# Patient Record
Sex: FEMALE | Race: WHITE | NOT HISPANIC OR LATINO | Employment: FULL TIME | URBAN - METROPOLITAN AREA
[De-identification: names, ages, dates, MRNs, and addresses within clinical notes are randomized per-mention and may not be internally consistent; named-entity substitution may affect disease eponyms.]

---

## 2020-01-06 ENCOUNTER — TELEPHONE (OUTPATIENT)
Dept: PULMONOLOGY | Facility: CLINIC | Age: 60
End: 2020-01-06

## 2020-01-16 NOTE — PROGRESS NOTES
Pulmonary Consultation   Latha Pierce 63 y o  female MRN: [id number]  1/17/2020      Assessment:    COPD (chronic obstructive pulmonary disease) ([place] [address] )  Patient presents with a history of at least moderate COPD  She has been out of her medications for several months  There's no evidence of acute exacerbation today  However she does report a persistent and daily cough that is affecting her ADLs  Plan  Have refilled patient's Advair, albuterol inhaler and albuterol nebs  Offered influenza vaccination but she states she cannot afford it today  Will need to review her history to see if she has had lung cancer screening  Due to time constraints today will have the patient follow-up with the next few weeks for repeat assessment  I counseled patient without tobacco use and advised her to complete entirely  Tobacco use  Advised to stop smoking  She is down to 5 cigarettes per day  Cough  Likely secondary to underlying COPD and continued tobacco use  Plan   Cont copd treatment   Tobacco cessation    Intercostal pain  Predominantly right-sided  Has tenderness to palpation of thorax  Pain is secondary to musculoskeletal irritation due to persistent/chronic coughing  Plan  Patient advised to alternate between Motrin and Tylenol  Given prescription for lidocaine patches as well  Plan:    Diagnoses and all orders for this visit:    Chronic obstructive pulmonary disease, unspecified COPD type ([place] [address] )  -     albuterol (PROVENTIL HFA,VENTOLIN HFA) 90 mcg/act inhaler; Inhale 2 puffs every 6 (six) hours as needed for wheezing  -     albuterol (2 5 mg/3 mL) 0 083 % nebulizer solution; Take 1 vial (2 5 mg total) by nebulization every 6 (six) hours as needed for wheezing or shortness of breath  -     fluticasone-salmeterol (ADVAIR, WIXELA) 250-50 mcg/dose inhaler; Inhale 1 puff 2 (two) times a day Rinse mouth after use  -     lidocaine (LIDODERM) 5 %;  Apply 1 patch topically daily Remove & Discard patch within 12 hours or as directed by MD    Other orders  -     Discontinue: fluticasone-salmeterol (Almas Lundberg) 250-50 mcg/dose inhaler; Inhale 1 puff 2 (two) times a day Rinse mouth after use  -     Discontinue: albuterol (PROVENTIL HFA,VENTOLIN HFA) 90 mcg/act inhaler; Inhale 2 puffs every 6 (six) hours as needed for wheezing  -     Discontinue: albuterol (2 5 mg/3 mL) 0 083 % nebulizer solution; Take 2 5 mg by nebulization every 6 (six) hours as needed for wheezing or shortness of breath        Return in about 3 weeks (around 2/7/2020)  History of Present Illness   HPI:  Roseline Valenzuela is a 63 y o  female with history of COPD  Patient presents as a new patient but is unfortunately is late to the appointment so the assessment is limited  Will plan on bringing her back within the next few weeks for full evaluation  Patient was previously seen by a pulmonologist in [place]  She was last seen by them in March 2019  Evidently she has a history of moderate COPD  She continues to smoke but is down to 5 cigarettes per day from a peak of 1 pack per day  She was given a trial of trelegy by her last pulmonologist and stated that it worked very well for her but unfortunately she was not able to afford it  She was also prescribed Advair at 1 point but has been off of this medication for a nearly 1 year  She is currently not taking any medications other than albuterol  She is requesting refills on her albuterol and albuterol nebulizer solution as well  She is mostly bothered by a chronic cough, typically nonproductive  She has right-sided muscle skeletal/thoracic pain related to the cough  She states that the cough is very limiting and has caused her to have syncope in the past   It affects her work  During the day she does not smoke but when she gets off she uses tobacco   She has not had a recent fevers, chills or sweats    She states that she did go to an urgent care within the past year for acute exacerbation of COPD  No recent hospitalizations  No other acute complaints today  Review of Systems   Constitutional: Negative for chills, fatigue and fever  HENT: Negative for congestion, nosebleeds, postnasal drip, rhinorrhea, sinus pressure and sore throat  Eyes: Negative for discharge, redness and itching  Respiratory: Positive for cough, shortness of breath and wheezing  Negative for choking, chest tightness and stridor  Cardiovascular: Negative for chest pain, palpitations and leg swelling  Gastrointestinal: Negative for blood in stool  Genitourinary: Negative for difficulty urinating and dysuria  Musculoskeletal: Negative for arthralgias, joint swelling and myalgias  Chronic thoracic pain  Exacerbated by coughing  Predominantly on the right side  Skin: Negative for color change and rash  Neurological: Negative for light-headedness and headaches  Hematological: Negative for adenopathy  Historical Information   No past medical history on file  No past surgical history on file  No family history on file    Hobbies:   Occupation:       Meds/Allergies     Current Outpatient Medications:     albuterol (2 5 mg/3 mL) 0 083 % nebulizer solution, Take 1 vial (2 5 mg total) by nebulization every 6 (six) hours as needed for wheezing or shortness of breath, Disp: 30 vial, Rfl: 3    albuterol (PROVENTIL HFA,VENTOLIN HFA) 90 mcg/act inhaler, Inhale 2 puffs every 6 (six) hours as needed for wheezing, Disp: 1 Inhaler, Rfl: 3    fluticasone-salmeterol (ADVAIR, WIXELA) 250-50 mcg/dose inhaler, Inhale 1 puff 2 (two) times a day Rinse mouth after use , Disp: 1 Inhaler, Rfl: 3    lidocaine (LIDODERM) 5 %, Apply 1 patch topically daily Remove & Discard patch within 12 hours or as directed by MD, Disp: 30 patch, Rfl: 3  Allergies no known allergies    Vitals: Blood pressure 120/80, pulse 92, temperature (!) 96 8 °F (36 °C), temperature source Tympanic, height 5' 4" (1 626 m), weight 83 5 kg (184 lb), SpO2 98 %  Body mass index is 31 58 kg/m²  Oxygen Therapy  SpO2: 98 %  Oxygen Therapy: None (Room air)      Physical Exam  Physical Exam   Constitutional: She is oriented to person, place, and time  She appears well-developed and well-nourished  No distress  HENT:   Head: Normocephalic and atraumatic  Right Ear: External ear normal    Left Ear: External ear normal    Nose: Nose normal    Mouth/Throat: Oropharynx is clear and moist  No oropharyngeal exudate  Eyes: Pupils are equal, round, and reactive to light  Conjunctivae and EOM are normal    Neck: Normal range of motion  No tracheal deviation present  Cardiovascular: Normal rate, regular rhythm, normal heart sounds and intact distal pulses  Exam reveals no gallop and no friction rub  No murmur heard  Pulmonary/Chest: Effort normal and breath sounds normal  No stridor  She has no wheezes  She has no rales  Musculoskeletal: She exhibits no edema  Lymphadenopathy:        Head (right side): No submental, no submandibular, no preauricular and no posterior auricular adenopathy present  Head (left side): No submental, no submandibular, no preauricular and no posterior auricular adenopathy present  She has no cervical adenopathy  Neurological: She is alert and oriented to person, place, and time  Skin: Skin is warm and dry  Psychiatric: She has a normal mood and affect  Vitals reviewed  Labs: I have personally reviewed pertinent lab results  No results found for: WBC, HGB, HCT, MCV, PLT  No results found for: GLUCOSE, CALCIUM, NA, K, CO2, CL, BUN, CREATININE  No results found for: IGE  No results found for: ALT, AST, GGT, ALKPHOS, BILITOT    Imaging and other studies:   None   Pulmonary function testing:   None     EKG, Pathology, and Other Studies:       [facility]

## 2020-01-17 ENCOUNTER — OFFICE VISIT (OUTPATIENT)
Dept: PULMONOLOGY | Facility: CLINIC | Age: 60
End: 2020-01-17

## 2020-01-17 VITALS
HEIGHT: 64 IN | HEART RATE: 92 BPM | DIASTOLIC BLOOD PRESSURE: 80 MMHG | BODY MASS INDEX: 31.41 KG/M2 | TEMPERATURE: 96.8 F | SYSTOLIC BLOOD PRESSURE: 120 MMHG | OXYGEN SATURATION: 98 % | WEIGHT: 184 LBS

## 2020-01-17 DIAGNOSIS — J44.9 CHRONIC OBSTRUCTIVE PULMONARY DISEASE, UNSPECIFIED COPD TYPE (HCC): Primary | ICD-10-CM

## 2020-01-17 PROBLEM — R05.9 COUGH: Status: ACTIVE | Noted: 2020-01-17

## 2020-01-17 PROBLEM — Z72.0 TOBACCO USE: Status: ACTIVE | Noted: 2020-01-17

## 2020-01-17 PROBLEM — R07.82 INTERCOSTAL PAIN: Status: ACTIVE | Noted: 2020-01-17

## 2020-01-17 PROCEDURE — 99204 OFFICE O/P NEW MOD 45 MIN: CPT | Performed by: INTERNAL MEDICINE

## 2020-01-17 RX ORDER — ALBUTEROL SULFATE 90 UG/1
2 AEROSOL, METERED RESPIRATORY (INHALATION) EVERY 6 HOURS PRN
COMMUNITY
End: 2020-01-17 | Stop reason: SDUPTHER

## 2020-01-17 RX ORDER — ALBUTEROL SULFATE 90 UG/1
2 AEROSOL, METERED RESPIRATORY (INHALATION) EVERY 6 HOURS PRN
Qty: 1 INHALER | Refills: 3 | Status: SHIPPED | OUTPATIENT
Start: 2020-01-17 | End: 2020-02-20 | Stop reason: SDUPTHER

## 2020-01-17 RX ORDER — ALBUTEROL SULFATE 2.5 MG/3ML
2.5 SOLUTION RESPIRATORY (INHALATION) EVERY 6 HOURS PRN
COMMUNITY
End: 2020-01-17 | Stop reason: SDUPTHER

## 2020-01-17 RX ORDER — LIDOCAINE 50 MG/G
1 PATCH TOPICAL DAILY
Qty: 30 PATCH | Refills: 3 | Status: SHIPPED | OUTPATIENT
Start: 2020-01-17 | End: 2021-06-01

## 2020-01-17 RX ORDER — ALBUTEROL SULFATE 2.5 MG/3ML
2.5 SOLUTION RESPIRATORY (INHALATION) EVERY 6 HOURS PRN
Qty: 30 VIAL | Refills: 3 | Status: SHIPPED | OUTPATIENT
Start: 2020-01-17 | End: 2020-02-20 | Stop reason: SDUPTHER

## 2020-01-17 NOTE — ASSESSMENT & PLAN NOTE
Likely secondary to underlying COPD and continued tobacco use       Plan   Cont copd treatment   Tobacco cessation

## 2020-01-17 NOTE — ASSESSMENT & PLAN NOTE
Predominantly right-sided  Has tenderness to palpation of thorax  Pain is secondary to musculoskeletal irritation due to persistent/chronic coughing  Plan  Patient advised to alternate between Motrin and Tylenol  Given prescription for lidocaine patches as well

## 2020-01-17 NOTE — ASSESSMENT & PLAN NOTE
Patient presents with a history of at least moderate COPD  She has been out of her medications for several months  There's no evidence of acute exacerbation today  However she does report a persistent and daily cough that is affecting her ADLs  Plan  Have refilled patient's Advair, albuterol inhaler and albuterol nebs  Offered influenza vaccination but she states she cannot afford it today  Will need to review her history to see if she has had lung cancer screening  Due to time constraints today will have the patient follow-up with the next few weeks for repeat assessment  I counseled patient without tobacco use and advised her to complete entirely

## 2020-02-19 NOTE — PROGRESS NOTES
Pulmonary Follow Up Note   Flaco Liu 63 y o  female MRN: [id number]  2/20/2020      Assessment:    COPD (chronic obstructive pulmonary disease) ([facility] )  Patient is a 72-year-old woman with a history moderate COPD presenting to the Pulmonary office for routine follow-up  During her last visit inhaler therapy was prescribed (had been off inhalers for over a year due to insurance issues)  She is currently taking Advair and albuterol nebs/inhaler  She has noticed a improvement in her shortness of breath  She has not had any recent exacerbations requiring ER visits or hospitalization  She still does have a persistent cough that is likely multifactorial     On examination no evidence of exacerbation today  Offered her a referrall for pulmonary function testing but she doesn't want to completed this time  Plan on continuing current inhalers and adding long-acting muscarinic antagonist inhaler  Patient has financial constraints and has had difficulty affording medications  She was given patient assistance packet today for Spiriva  Also given samples for Spiriva Respimat  She tells me that she had a CT of the chest within the past year that showed no evidence of lung cancer  Plan  Continue albuterol and Advair  Start Spiriva Respimat  Will offer pulmonary function tests again during next encounter  Follow-up Pulmonary office in 3 months  Will need repeat CT of the chest in 1 year for lung cancer screening  Review immunizations    Tobacco use  Continues to smoke  Has cut down to 5 cigarettes per day, but has not been able to reduce it any further  Not interested in going back to cessation classes  She states that her job and life are stressful and cigarettes help her relax  Plan   Continue to encourage cessation  I informed her that her continued tobacco use is causing lung damage and also aggravating her cough  GERD (gastroesophageal reflux disease)  Reports daily heart burn   I suspect this is also aggravating her cough  She states that takes     Plan   Patient states she will buy  over-the-counter PPI  Continue related    Intercostal pain  Secondary to coughing  Improved with lidocaine patch     Plan   Cont lidocaine patch   Cont treatment of cough     Cough  Likely multifactorial secondary to underlying COPD, GERD, upper airway cough syndrome and continued tobacco use  Plan  Treat underlying conditions  Pulmonary nodules  Review of outside notes from her prior pulmonary provider shows that she has undergone CT scanning of the chest that showed bilateral pulmonary nodules that were felt to be inflammatory/post infectious  Follow-up imaging showed stability  However those images are not available for my review  I asked the patient about that workup and she states she had a CT scan 1 year ago that was normal   I think that she will benefit from yearly lung cancer screening  Plan:    Diagnoses and all orders for this visit:    Cough    Intercostal pain    Tobacco use    Chronic obstructive pulmonary disease, unspecified COPD type ([facility] )  -     albuterol (2 5 mg/3 mL) 0 083 % nebulizer solution; Take 1 vial (2 5 mg total) by nebulization every 6 (six) hours as needed for wheezing or shortness of breath  -     albuterol (PROVENTIL HFA,VENTOLIN HFA) 90 mcg/act inhaler; Inhale 2 puffs every 6 (six) hours as needed for wheezing    Post-nasal drip  -     mometasone (NASONEX) 50 mcg/act nasal spray; 2 sprays into each nostril daily        Return in about 3 months (around 5/20/2020) for Recheck  History of Present Illness   HPI:  Lawerence Schirmer is a 63 y o  female who is here to follow-up on COPD  She was seen in November to the as 20  Previously was followed by a pulmonologist in [place]  Patient is an active smoker  Previously treated with trelegy inhaler that was effective  However unable to afford the medication  When I last saw her she has been off of her Advair for over 1 year    She is not taking any medications  The time she reported significant and chronic cough, right-sided thoracic pain related to the cough  She admitted urgent care within the past year related to COPD exacerbation  During that encounter I feel the patient after an albuterol inhaler/albuterol nebs  She was offered influenza vaccination at that time does not include afford  She was advised to stop smoking  She is given lidocaine patches for the intercostal pain  She presents today for follow-up  She states she is doing okay  Her breathing is much better  She using lidocaine patches for her R chest pain with good improvement  She is using nebulize every morning  However, she still has a persistent cough  She is taking mucinex  She is constantly having nasal congestion and drainage that leads to her cough  She denies allergy symptoms  She does have chronic heart burn  She is still smoking 5 cigarettes per day (peak was smoking 1-1 5 packs per day)  She states she has tried multiple medications and classes w/o success  Review of Systems   Constitutional: Negative for chills, fatigue and fever  HENT: Positive for postnasal drip  Negative for congestion, nosebleeds, rhinorrhea, sinus pressure and sore throat  Eyes: Negative for discharge, redness and itching  Respiratory: Positive for cough, shortness of breath and wheezing  Negative for choking, chest tightness and stridor  Cardiovascular: Negative for chest pain, palpitations and leg swelling  Gastrointestinal: Negative for blood in stool  Genitourinary: Negative for difficulty urinating and dysuria  Musculoskeletal: Negative for arthralgias, joint swelling and myalgias  Skin: Negative for color change and rash  Neurological: Negative for light-headedness and headaches  Hematological: Negative for adenopathy         Historical Information   Past Medical History:   Diagnosis Date    COPD (chronic obstructive pulmonary disease) [facility])      History reviewed  No pertinent surgical history  Family History   Problem Relation Age of Onset    Alzheimer's disease Father          Meds/Allergies     Current Outpatient Medications:     albuterol (2 5 mg/3 mL) 0 083 % nebulizer solution, Take 1 vial (2 5 mg total) by nebulization every 6 (six) hours as needed for wheezing or shortness of breath, Disp: 30 vial, Rfl: 3    albuterol (PROVENTIL HFA,VENTOLIN HFA) 90 mcg/act inhaler, Inhale 2 puffs every 6 (six) hours as needed for wheezing, Disp: 1 Inhaler, Rfl: 3    fluticasone-salmeterol (ADVAIR, WIXELA) 250-50 mcg/dose inhaler, Inhale 1 puff 2 (two) times a day Rinse mouth after use , Disp: 1 Inhaler, Rfl: 3    lidocaine (LIDODERM) 5 %, Apply 1 patch topically daily Remove & Discard patch within 12 hours or as directed by MD, Disp: 30 patch, Rfl: 3    mometasone (NASONEX) 50 mcg/act nasal spray, 2 sprays into each nostril daily, Disp: 1 Act, Rfl: 3  No Known Allergies    Vitals: Blood pressure 132/80, pulse 83, temperature (!) 97 °F (36 1 °C), temperature source Tympanic, height 5' 4" (1 626 m), weight 81 6 kg (180 lb), SpO2 98 %  Body mass index is 30 9 kg/m²  Oxygen Therapy  SpO2: 98 %  Oxygen Therapy: None (Room air)      Physical Exam  Physical Exam   Constitutional: She is oriented to person, place, and time  She appears well-developed and well-nourished  No distress  Tired appearing  Coughing intermittently throughout the encounter  HENT:   Head: Normocephalic and atraumatic  Right Ear: External ear normal    Left Ear: External ear normal    Nose: Nose normal    Mouth/Throat: Oropharynx is clear and moist  No oropharyngeal exudate  Eyes: Pupils are equal, round, and reactive to light  Conjunctivae and EOM are normal    Neck: Normal range of motion  No tracheal deviation present  Cardiovascular: Normal rate, regular rhythm, normal heart sounds and intact distal pulses  Exam reveals no gallop and no friction rub     No murmur heard   Pulmonary/Chest: Effort normal and breath sounds normal  No stridor  She has no wheezes  She has no rales  Musculoskeletal: She exhibits no edema  Lymphadenopathy:        Head (right side): No submental, no submandibular, no preauricular and no posterior auricular adenopathy present  Head (left side): No submental, no submandibular, no preauricular and no posterior auricular adenopathy present  She has no cervical adenopathy  Neurological: She is alert and oriented to person, place, and time  Skin: Skin is warm and dry  Psychiatric: She has a normal mood and affect  Vitals reviewed  Labs: I have personally reviewed pertinent lab results  Imaging and other studies: I have personally reviewed pertinent reports  Pulmonary function testing:     EKG, Pathology, and Other Studies:     [facility]

## 2020-02-20 ENCOUNTER — OFFICE VISIT (OUTPATIENT)
Dept: PULMONOLOGY | Facility: CLINIC | Age: 60
End: 2020-02-20

## 2020-02-20 VITALS
TEMPERATURE: 97 F | OXYGEN SATURATION: 98 % | HEART RATE: 83 BPM | HEIGHT: 64 IN | DIASTOLIC BLOOD PRESSURE: 80 MMHG | SYSTOLIC BLOOD PRESSURE: 132 MMHG | BODY MASS INDEX: 30.73 KG/M2 | WEIGHT: 180 LBS

## 2020-02-20 DIAGNOSIS — J44.9 CHRONIC OBSTRUCTIVE PULMONARY DISEASE, UNSPECIFIED COPD TYPE (HCC): ICD-10-CM

## 2020-02-20 DIAGNOSIS — R05.9 COUGH: Primary | ICD-10-CM

## 2020-02-20 DIAGNOSIS — R09.82 POST-NASAL DRIP: ICD-10-CM

## 2020-02-20 DIAGNOSIS — R07.82 INTERCOSTAL PAIN: ICD-10-CM

## 2020-02-20 DIAGNOSIS — Z72.0 TOBACCO USE: ICD-10-CM

## 2020-02-20 PROBLEM — K21.9 GERD (GASTROESOPHAGEAL REFLUX DISEASE): Status: ACTIVE | Noted: 2020-02-20

## 2020-02-20 PROBLEM — R91.8 PULMONARY NODULES: Status: ACTIVE | Noted: 2020-02-20

## 2020-02-20 PROCEDURE — 99215 OFFICE O/P EST HI 40 MIN: CPT | Performed by: INTERNAL MEDICINE

## 2020-02-20 RX ORDER — ALBUTEROL SULFATE 90 UG/1
2 AEROSOL, METERED RESPIRATORY (INHALATION) EVERY 6 HOURS PRN
Qty: 1 INHALER | Refills: 3 | Status: SHIPPED | OUTPATIENT
Start: 2020-02-20 | End: 2020-12-04 | Stop reason: SDUPTHER

## 2020-02-20 RX ORDER — MOMETASONE FUROATE 50 UG/1
2 SPRAY, METERED NASAL DAILY
Qty: 1 ACT | Refills: 3 | Status: SHIPPED | OUTPATIENT
Start: 2020-02-20 | End: 2022-04-18

## 2020-02-20 RX ORDER — ALBUTEROL SULFATE 2.5 MG/3ML
2.5 SOLUTION RESPIRATORY (INHALATION) EVERY 6 HOURS PRN
Qty: 30 VIAL | Refills: 3 | Status: SHIPPED | OUTPATIENT
Start: 2020-02-20 | End: 2020-11-09 | Stop reason: SDUPTHER

## 2020-02-20 NOTE — ASSESSMENT & PLAN NOTE
Secondary to coughing   Improved with lidocaine patch     Plan   Cont lidocaine patch   Cont treatment of cough

## 2020-02-20 NOTE — ASSESSMENT & PLAN NOTE
Continues to smoke  Has cut down to 5 cigarettes per day, but has not been able to reduce it any further  Not interested in going back to cessation classes  She states that her job and life are stressful and cigarettes help her relax  Plan   Continue to encourage cessation  I informed her that her continued tobacco use is causing lung damage and also aggravating her cough

## 2020-02-20 NOTE — PATIENT INSTRUCTIONS
For [facility] rinse kit use sterile water or distilled water  Can be used 1-2 times per day  Recommend using it prior to using intranasal medications (flonase)       Use acid reflux medication daily     Try to cut down on tobacco completely

## 2020-02-20 NOTE — ASSESSMENT & PLAN NOTE
Patient is a 81-year-old woman with a history moderate COPD presenting to the Pulmonary office for routine follow-up  During her last visit inhaler therapy was prescribed (had been off inhalers for over a year due to insurance issues)  She is currently taking Advair and albuterol nebs/inhaler  She has noticed a improvement in her shortness of breath  She has not had any recent exacerbations requiring ER visits or hospitalization  She still does have a persistent cough that is likely multifactorial     On examination no evidence of exacerbation today  Offered her a referrall for pulmonary function testing but she doesn't want to completed this time  Plan on continuing current inhalers and adding long-acting muscarinic antagonist inhaler  Patient has financial constraints and has had difficulty affording medications  She was given patient assistance packet today for Spiriva  Also given samples for Spiriva Respimat  She tells me that she had a CT of the chest within the past year that showed no evidence of lung cancer      Plan  Continue albuterol and Advair  Start Spiriva Respimat  Will offer pulmonary function tests again during next encounter  Follow-up Pulmonary office in 3 months  Will need repeat CT of the chest in 1 year for lung cancer screening  Review immunizations

## 2020-02-20 NOTE — ASSESSMENT & PLAN NOTE
Likely multifactorial secondary to underlying COPD, GERD, upper airway cough syndrome and continued tobacco use  Plan  Treat underlying conditions

## 2020-02-20 NOTE — ASSESSMENT & PLAN NOTE
Review of outside notes from her prior pulmonary provider shows that she has undergone CT scanning of the chest that showed bilateral pulmonary nodules that were felt to be inflammatory/post infectious  Follow-up imaging showed stability  However those images are not available for my review  I asked the patient about that workup and she states she had a CT scan 1 year ago that was normal   I think that she will benefit from yearly lung cancer screening

## 2020-04-22 ENCOUNTER — TELEPHONE (OUTPATIENT)
Dept: PULMONOLOGY | Facility: CLINIC | Age: 60
End: 2020-04-22

## 2020-11-09 DIAGNOSIS — J44.9 CHRONIC OBSTRUCTIVE PULMONARY DISEASE, UNSPECIFIED COPD TYPE (HCC): ICD-10-CM

## 2020-11-09 RX ORDER — ALBUTEROL SULFATE 2.5 MG/3ML
2.5 SOLUTION RESPIRATORY (INHALATION) EVERY 6 HOURS PRN
Qty: 30 VIAL | Refills: 4 | Status: SHIPPED | OUTPATIENT
Start: 2020-11-09 | End: 2020-12-04 | Stop reason: SDUPTHER

## 2020-12-04 ENCOUNTER — OFFICE VISIT (OUTPATIENT)
Dept: PULMONOLOGY | Facility: CLINIC | Age: 60
End: 2020-12-04

## 2020-12-04 VITALS
OXYGEN SATURATION: 97 % | HEART RATE: 102 BPM | SYSTOLIC BLOOD PRESSURE: 120 MMHG | WEIGHT: 172 LBS | RESPIRATION RATE: 16 BRPM | BODY MASS INDEX: 29.52 KG/M2 | DIASTOLIC BLOOD PRESSURE: 80 MMHG | TEMPERATURE: 96.4 F

## 2020-12-04 DIAGNOSIS — Z72.0 TOBACCO USE: ICD-10-CM

## 2020-12-04 DIAGNOSIS — R91.8 PULMONARY NODULES: ICD-10-CM

## 2020-12-04 DIAGNOSIS — J44.9 CHRONIC OBSTRUCTIVE PULMONARY DISEASE, UNSPECIFIED COPD TYPE (HCC): ICD-10-CM

## 2020-12-04 DIAGNOSIS — R05.9 COUGH: Primary | ICD-10-CM

## 2020-12-04 PROCEDURE — 99213 OFFICE O/P EST LOW 20 MIN: CPT | Performed by: INTERNAL MEDICINE

## 2020-12-04 RX ORDER — ALBUTEROL SULFATE 2.5 MG/3ML
2.5 SOLUTION RESPIRATORY (INHALATION) EVERY 6 HOURS PRN
Qty: 30 VIAL | Refills: 4 | Status: SHIPPED | OUTPATIENT
Start: 2020-12-04 | End: 2021-10-15 | Stop reason: SDUPTHER

## 2020-12-04 RX ORDER — BENZONATATE 100 MG/1
100 CAPSULE ORAL 3 TIMES DAILY PRN
Qty: 20 CAPSULE | Refills: 0 | Status: SHIPPED | OUTPATIENT
Start: 2020-12-04 | End: 2021-05-14 | Stop reason: SDUPTHER

## 2020-12-04 RX ORDER — ALBUTEROL SULFATE 90 UG/1
2 AEROSOL, METERED RESPIRATORY (INHALATION) EVERY 6 HOURS PRN
Qty: 1 INHALER | Refills: 3 | Status: SHIPPED | OUTPATIENT
Start: 2020-12-04 | End: 2022-02-14 | Stop reason: SDUPTHER

## 2021-01-21 DIAGNOSIS — J44.9 CHRONIC OBSTRUCTIVE PULMONARY DISEASE, UNSPECIFIED COPD TYPE (HCC): Primary | ICD-10-CM

## 2021-03-10 DIAGNOSIS — Z23 ENCOUNTER FOR IMMUNIZATION: ICD-10-CM

## 2021-03-16 ENCOUNTER — IMMUNIZATIONS (OUTPATIENT)
Dept: FAMILY MEDICINE CLINIC | Facility: HOSPITAL | Age: 61
End: 2021-03-16

## 2021-03-16 DIAGNOSIS — Z23 ENCOUNTER FOR IMMUNIZATION: Primary | ICD-10-CM

## 2021-03-16 PROCEDURE — 91300 SARS-COV-2 / COVID-19 MRNA VACCINE (PFIZER-BIONTECH) 30 MCG: CPT

## 2021-03-16 PROCEDURE — 0001A SARS-COV-2 / COVID-19 MRNA VACCINE (PFIZER-BIONTECH) 30 MCG: CPT

## 2021-03-30 ENCOUNTER — TELEPHONE (OUTPATIENT)
Dept: PULMONOLOGY | Facility: CLINIC | Age: 61
End: 2021-03-30

## 2021-04-06 ENCOUNTER — IMMUNIZATIONS (OUTPATIENT)
Dept: FAMILY MEDICINE CLINIC | Facility: HOSPITAL | Age: 61
End: 2021-04-06

## 2021-04-06 DIAGNOSIS — Z23 ENCOUNTER FOR IMMUNIZATION: Primary | ICD-10-CM

## 2021-04-06 PROCEDURE — 0002A SARS-COV-2 / COVID-19 MRNA VACCINE (PFIZER-BIONTECH) 30 MCG: CPT

## 2021-04-06 PROCEDURE — 91300 SARS-COV-2 / COVID-19 MRNA VACCINE (PFIZER-BIONTECH) 30 MCG: CPT

## 2021-04-09 ENCOUNTER — TELEPHONE (OUTPATIENT)
Dept: PULMONOLOGY | Facility: CLINIC | Age: 61
End: 2021-04-09

## 2021-04-09 NOTE — TELEPHONE ENCOUNTER
David Naa came to the office because she did not get our message regarding her appointment  She said she feels fine and is very appreciative of the 6 months of trelegy for free  I did provider her with a spacer for her rescue inhaler, and added her to the recall list for 6months  She said that she will call if anything comes up but really just wanted to Thank you for everything you did

## 2021-05-14 DIAGNOSIS — R05.9 COUGH: ICD-10-CM

## 2021-05-14 RX ORDER — BENZONATATE 100 MG/1
100 CAPSULE ORAL 3 TIMES DAILY PRN
Qty: 90 CAPSULE | Refills: 0 | Status: SHIPPED | OUTPATIENT
Start: 2021-05-14 | End: 2021-09-17 | Stop reason: SDUPTHER

## 2021-05-31 NOTE — PROGRESS NOTES
Pulmonary Follow Up Note   Aziza Crum 62 y o  female MRN: [id number]  6/1/2021      Assessment:    COPD (chronic obstructive pulmonary disease) ([facility] [address] )  Patient reports overall improvement since starting Trelegy  She is happy that she has been able to receive medication assistance  She has not had any recent exacerbations of her COPD  However that she is still experiencing cough induced syncope  Plan   Continue Trelegy, albuterol  Given samples today for highest dose of trelegy to see if that helps to mitigate her cough  Still needs lung cancer screening CT   I have referred her to our smoking cessation program   She is very interested in quitting  Syncope    Patient has cough induced syncope  May benefit from referral to cardiology is well to discern if there are other causes  In the meantime continue COPD medications  Also empirically started treatment with gabapentin 100 mg t i d    Can titrate this dose if  Tolerated  Tobacco use    Referred to smoking cessation program    Pulmonary nodules  Review of outside notes from her prior pulmonary provider shows that she has undergone CT scanning of the chest that showed bilateral pulmonary nodules that were felt to be inflammatory/post infectious   Follow-up imaging showed stability   However those images are not available for my review       Will discuss during follow-up  Cough  Likely multifactorial secondary to underlying COPD, GERD, upper airway cough syndrome and continued tobacco use      Plan  Treat underlying conditions  Trial of gabapentin  Plan:    Diagnoses and all orders for this visit:    Cigarette nicotine dependence without complication  -     Ambulatory referral to Smoking Cessation Program; Future    Tobacco use    Pulmonary nodules    Cough    Chronic obstructive pulmonary disease, unspecified COPD type (HCC)  -     gabapentin (NEURONTIN) 100 mg capsule;  Take 1 capsule (100 mg total) by mouth 3 (three) times a day    Vasovagal syncope  -     gabapentin (NEURONTIN) 100 mg capsule; Take 1 capsule (100 mg total) by mouth 3 (three) times a day        No follow-ups on file  History of Present Illness   HPI:  Antonio George is a 62 y o  female history of moderate COPD  She presents today for routine follow-up  States that trelegy has helped  She has been able to get medication assistance  However, has cough induced syncope  Two weeks ago she passed out and hit her left side  She has a bruise on her left flank/back  She has gained 12 pounds  She is interested in quitting smoking  she does not have any other acute complaints today  Review of Systems   Constitutional: Negative for chills, fatigue and fever  HENT: Negative for congestion, nosebleeds, postnasal drip, rhinorrhea, sinus pressure and sore throat  Eyes: Negative for discharge, redness and itching  Respiratory: Positive for cough and shortness of breath  Negative for choking, chest tightness, wheezing and stridor  Cardiovascular: Negative for chest pain, palpitations and leg swelling  Gastrointestinal: Negative for blood in stool  Genitourinary: Negative for difficulty urinating and dysuria  Musculoskeletal: Negative for arthralgias, joint swelling and myalgias  Skin: Negative for color change and rash  Neurological: Negative for light-headedness and headaches  Hematological: Negative for adenopathy  Historical Information   Past Medical History:   Diagnosis Date    [facility]      History reviewed  No pertinent surgical history    Family History   Problem Relation Age of Onset    Alzheimer's disease Father          Meds/Allergies     Current Outpatient Medications:     albuterol (2 5 mg/3 mL) 0 083 % nebulizer solution, Take 1 vial (2 5 mg total) by nebulization every 6 (six) hours as needed for wheezing or shortness of breath, Disp: 30 vial, Rfl: 4    albuterol (PROVENTIL HFA,VENTOLIN HFA) 90 mcg/act inhaler, Inhale 2 puffs every 6 (six) hours as needed for wheezing, Disp: 1 Inhaler, Rfl: 3    benzonatate (TESSALON PERLES) 100 mg capsule, Take 1 capsule (100 mg total) by mouth 3 (three) times a day as needed for cough, Disp: 90 capsule, Rfl: 0    fluticasone-umeclidinium-vilanterol (TRELEGY) 100-62 5-25 MCG/INH inhaler, Inhale 1 puff daily Rinse mouth after use , Disp: 1 Inhaler, Rfl: 5    fluticasone-salmeterol (ADVAIR, WIXELA) 250-50 mcg/dose inhaler, Inhale 1 puff 2 (two) times a day Rinse mouth after use  (Patient not taking: Reported on 6/1/2021), Disp: 1 Inhaler, Rfl: 3    gabapentin (NEURONTIN) 100 mg capsule, Take 1 capsule (100 mg total) by mouth 3 (three) times a day, Disp: 90 capsule, Rfl: 3    mometasone (NASONEX) 50 mcg/act nasal spray, 2 sprays into each nostril daily (Patient not taking: Reported on 6/1/2021), Disp: 1 Act, Rfl: 3  No Known Allergies    Vitals: Blood pressure 148/90, pulse (!) 112, temperature 97 7 °F (36 5 °C), temperature source Temporal, height 5' 4" (1 626 m), weight 84 8 kg (187 lb), SpO2 98 %  Body mass index is 32 1 kg/m²  Oxygen Therapy  SpO2: 98 %  Oxygen Therapy: None (Room air)      Physical Exam  Physical Exam  Vitals signs reviewed  Constitutional:       General: She is not in acute distress  Appearance: She is well-developed  She is not ill-appearing or diaphoretic  HENT:      Head: Normocephalic and atraumatic  Right Ear: External ear normal       Left Ear: External ear normal       Nose: Nose normal  No congestion or rhinorrhea  Mouth/Throat:      Pharynx: No oropharyngeal exudate or posterior oropharyngeal erythema  Eyes:      General: No scleral icterus  Right eye: No discharge  Left eye: No discharge  Conjunctiva/sclera: Conjunctivae normal       Pupils: Pupils are equal, round, and reactive to light  Neck:      Musculoskeletal: Normal range of motion  Trachea: No tracheal deviation  Cardiovascular:      Rate and Rhythm: Normal rate and regular rhythm  Heart sounds: Normal heart sounds  No murmur  No friction rub  No gallop  Pulmonary:      Effort: Pulmonary effort is normal       Breath sounds: Normal breath sounds  No stridor  No wheezing or rales  Musculoskeletal:      Right lower leg: No edema  Left lower leg: No edema  Lymphadenopathy:      Head:      Right side of head: No submental, submandibular, preauricular or posterior auricular adenopathy  Left side of head: No submental, submandibular, preauricular or posterior auricular adenopathy  Cervical: No cervical adenopathy  Skin:     General: Skin is warm and dry  Findings: Bruising and lesion present  No erythema or rash  Neurological:      Mental Status: She is alert and oriented to person, place, and time  Labs:   None recently     Imaging and other studies:   Outside records reviewed from previous pulmonologist  Last seen in 2016  CT imaging at that time showed stable pulmonary nodules  Pulmonary function testing:   None recently   EKG, Pathology, and Other Studies:     JIM Castillo    [facility]

## 2021-06-01 ENCOUNTER — OFFICE VISIT (OUTPATIENT)
Dept: PULMONOLOGY | Facility: CLINIC | Age: 61
End: 2021-06-01

## 2021-06-01 VITALS
TEMPERATURE: 97.7 F | OXYGEN SATURATION: 98 % | HEART RATE: 112 BPM | WEIGHT: 187 LBS | SYSTOLIC BLOOD PRESSURE: 148 MMHG | HEIGHT: 64 IN | BODY MASS INDEX: 31.92 KG/M2 | DIASTOLIC BLOOD PRESSURE: 90 MMHG

## 2021-06-01 DIAGNOSIS — R91.8 PULMONARY NODULES: ICD-10-CM

## 2021-06-01 DIAGNOSIS — J44.9 CHRONIC OBSTRUCTIVE PULMONARY DISEASE, UNSPECIFIED COPD TYPE (HCC): ICD-10-CM

## 2021-06-01 DIAGNOSIS — R55 VASOVAGAL SYNCOPE: ICD-10-CM

## 2021-06-01 DIAGNOSIS — F17.210 CIGARETTE NICOTINE DEPENDENCE WITHOUT COMPLICATION: Primary | ICD-10-CM

## 2021-06-01 DIAGNOSIS — R05.9 COUGH: ICD-10-CM

## 2021-06-01 DIAGNOSIS — Z72.0 TOBACCO USE: ICD-10-CM

## 2021-06-01 PROCEDURE — 99215 OFFICE O/P EST HI 40 MIN: CPT | Performed by: INTERNAL MEDICINE

## 2021-06-01 RX ORDER — GABAPENTIN 100 MG/1
100 CAPSULE ORAL 3 TIMES DAILY
Qty: 90 CAPSULE | Refills: 3 | Status: SHIPPED | OUTPATIENT
Start: 2021-06-01 | End: 2021-08-03 | Stop reason: SDUPTHER

## 2021-06-01 NOTE — ASSESSMENT & PLAN NOTE
Review of outside notes from her prior pulmonary provider shows that she has undergone CT scanning of the chest that showed bilateral pulmonary nodules that were felt to be inflammatory/post infectious   Follow-up imaging showed stability   However those images are not available for my review       Will discuss during follow-up

## 2021-06-01 NOTE — ASSESSMENT & PLAN NOTE
Likely multifactorial secondary to underlying COPD, GERD, upper airway cough syndrome and continued tobacco use      Plan  Treat underlying conditions  Trial of gabapentin

## 2021-06-01 NOTE — ASSESSMENT & PLAN NOTE
Patient has cough induced syncope  May benefit from referral to cardiology is well to discern if there are other causes  In the meantime continue COPD medications  Also empirically started treatment with gabapentin 100 mg t i d    Can titrate this dose if  Tolerated

## 2021-06-01 NOTE — ASSESSMENT & PLAN NOTE
Patient reports overall improvement since starting Trelegy  She is happy that she has been able to receive medication assistance  She has not had any recent exacerbations of her COPD  However that she is still experiencing cough induced syncope  Plan   Continue Trelegy, albuterol  Given samples today for highest dose of trelegy to see if that helps to mitigate her cough  Still needs lung cancer screening CT   I have referred her to our smoking cessation program   She is very interested in quitting

## 2021-06-30 ENCOUNTER — TELEMEDICINE (OUTPATIENT)
Dept: PULMONOLOGY | Facility: CLINIC | Age: 61
End: 2021-06-30

## 2021-06-30 VITALS — BODY MASS INDEX: 31.92 KG/M2 | HEIGHT: 64 IN | WEIGHT: 187 LBS

## 2021-06-30 DIAGNOSIS — T65.294A TOXIC EFFECT OF TOBACCO, UNDETERMINED INTENT, INITIAL ENCOUNTER: Primary | ICD-10-CM

## 2021-06-30 PROBLEM — T65.291A TOXIC EFFECT OF TOBACCO: Status: ACTIVE | Noted: 2021-06-30

## 2021-06-30 PROCEDURE — 99214 OFFICE O/P EST MOD 30 MIN: CPT | Performed by: NURSE PRACTITIONER

## 2021-06-30 PROCEDURE — 99407 BEHAV CHNG SMOKING > 10 MIN: CPT | Performed by: NURSE PRACTITIONER

## 2021-06-30 RX ORDER — BUPROPION HYDROCHLORIDE 150 MG/1
TABLET, EXTENDED RELEASE ORAL
Qty: 60 TABLET | Refills: 2 | Status: SHIPPED | OUTPATIENT
Start: 2021-06-30 | End: 2022-04-18

## 2021-06-30 NOTE — ASSESSMENT & PLAN NOTE
·  Reason for training program:  Worsening Chronic obstructive pulmonary disease is currently affecting her everyday life  ·  smoking history:   Started at age 16  Was smoking an estimated 1 pack per day over the last year she has reduced down to an estimated 40 pack per year despite slowly reducing  ·   Barriers:  Smoking has become a significant part of her everyday life, currently she lives with 2 additional smokers although the barrier will likely turn into motivation as  all plan for cessation  ·  no full  Cessation in the past has been attempted  · Plan: start Wellbutrin 150mg daily x 3 days and then increase to 150mg BID  Initially discussed use of Chantix although she currently does not have prescription coverage and this medication will be cost prohibitive    ·   Started for  Wellbutrin 7/2, quit date for smoking 7/16  ·  if  Cravings become unbearable I have recommended that she add 7 mg nicotine patch or use nicotine gum or lozenges  ·  will follow-up in 4-6 weeks or sooner if needed

## 2021-06-30 NOTE — PROGRESS NOTES
Smoking Cessation Program  Shaylee Hazel is a 62 y o  y o  female  [id number]     Assessment/Plan:      Problem List Items Addressed This Visit        Other    Toxic effect of tobacco - Primary     ·  Reason for training program:  Worsening Chronic obstructive pulmonary disease is currently affecting her everyday life  ·  smoking history:   Started at age 16  Was smoking an estimated 1 pack per day over the last year she has reduced down to an estimated 40 pack per year despite slowly reducing  ·   Barriers:  Smoking has become a significant part of her everyday life, currently she lives with 2 additional smokers although the barrier will likely turn into motivation as  all plan for cessation  ·  no full  Cessation in the past has been attempted  · Plan: start Wellbutrin 150mg daily x 3 days and then increase to 150mg BID  Initially discussed use of Chantix although she currently does not have prescription coverage and this medication will be cost prohibitive  ·   Started for  Wellbutrin 7/2, quit date for smoking 7/16  ·  if  Cravings become unbearable I have recommended that she add 7 mg nicotine patch or use nicotine gum or lozenges  ·  will follow-up in 4-6 weeks or sooner if needed         Relevant Medications    buPROPion (Wellbutrin SR) 150 mg 12 hr tablet          I advised patient to quit, and offered support  Discussed current use pattern  Wellbutrin: denies h/o seizures or eating disorders  Follow up in 5 week or as needed     Barriers to quitting include: friends smoke, under a lot of stress now and engrained in daily life  Treatment Plan: wellbutrin         Dependence Severity:  severe            Return in 4 weeks (on 7/28/2021)  All questions are answered to the patient's satisfaction and understanding  She verbalizes understanding  She is encouraged to call with any further questions or concerns          ______________________________________________________________________ Patient ID: Sagrario Lim is a 62 y o  female  CC:    Chief Complaint   Patient presents with    Virtual Regular Visit    Nicotine Dependence       HPI:      HPI    Smoking history:  What type of tobacco product used:  Cigarettes    How many Packs of Cigarettes per day on average:  1/4 PPD    Largest amount of tobacco EVER used regularly: 1 PPD    How old when started using Tobacco regularly:  16years old    How many years using tobacco regularly:  47 years    How soon after waking up do you smoke:  6-30 Minutes    Past Attempts to go Tobacco free:   Intentionally Cut Down or limit use:  YES/NO: yes     How many serious attempt to go tobacco free:  None        Desire to Quit:  Quit within the next month    Motivations to Quit:  Family, Friends and Health Concerns    Scale on 1-10, how motivated:  7    Has a Healthcare Professional recommend smoking cessation? Yes    What Obstacles do you face for quitting:  Intense Cravings and Lack of willpower    From 1-10, how confident are you that you will bee Tobacco free in 6 Months? 7 1: Not confident, 10 Extremely Confident    * Arlen Singh has been smoking since age 16 she has recently reduced her cigarette steroid estimated quarter pack per day  She does continue to use those last few cigarettes as treats throughout the day  Discussed how removing these last  Few cigarettes would be beneficial  To fully   Break the habit  Overall her main motivation for smoking cessation is improve her health as Chronic obstructive pulmonary disease is not affecting her daily life  She does currently live with her sister and her sister's significant other, all 3 to smoke  All 3 individuals plan to work towards   Smoking cessation together  She does not have a history of eating disorder, seizures or bipolar disorder  She is interested in starting Wellbutrin due to cost of Chantix  Reviewed possible side effects of starting Wellbutrin      I have provided her with a coupon with good Rx use at her local [facility]  She will contact me for follow-up if necessary due to intense cravings and we may start nicotine replacement therapy  Review of Systems   Constitutional: Negative for activity change and diaphoresis  HENT: Negative for congestion and postnasal drip  Eyes: Negative for discharge and itching  Respiratory: Positive for cough and shortness of breath  Negative for apnea, choking, chest tightness, wheezing and stridor  Cardiovascular: Negative for chest pain and leg swelling  Gastrointestinal: Negative for abdominal distention  Endocrine: Negative for cold intolerance and heat intolerance  Genitourinary: Negative for difficulty urinating  Musculoskeletal: Negative for arthralgias  Skin: Negative for color change, pallor, rash and wound  Allergic/Immunologic: Negative for environmental allergies and food allergies  Neurological: Negative for dizziness  Hematological: Negative for adenopathy  Does not bruise/bleed easily  Psychiatric/Behavioral: Negative for agitation and behavioral problems  All other systems reviewed and are negative  The following portions of the patient's history were reviewed and updated as appropriate: allergies, current medications, past family history, past medical history, past social history, past surgical history and problem list      She reports that she has been smoking cigarettes  She started smoking about 47 years ago  She has a 47 00 pack-year smoking history  She has never used smokeless tobacco     Social history: She reports that she has been smoking cigarettes  She started smoking about 47 years ago  She has a 47 00 pack-year smoking history  She has never used smokeless tobacco  She reports previous alcohol use  She reports that she does not use drugs  Past Medical History:   Diagnosis Date    COPD (chronic obstructive pulmonary disease) ([facility] [address] )      History reviewed  No pertinent surgical history  Family History   Problem Relation Age of Onset    Alzheimer's disease Father      Immunization History   Administered Date(s) Administered    Influenza Quadrivalent Preservative Free 3 years and older IM 01/14/2017    SARS-CoV-2 / COVID-19 mRNA IM (Pf[employer]) 03/16/2021, 04/06/2021    Tdap 01/14/2017     (Not in a hospital admission)      Allergies: Patient has no known allergies  Objective:  Vitals:    06/30/21 0835   Weight: 84 8 kg (187 lb)   Height: 5' 4" (1 626 m)       Body mass index is 32 1 kg/m²  Physical Exam  Constitutional:       Appearance: Normal appearance  HENT:      Head: Normocephalic and atraumatic  Pulmonary:      Effort: Pulmonary effort is normal    Neurological:      General: No focal deficit present  Mental Status: She is alert and oriented to person, place, and time  Psychiatric:         Mood and Affect: Mood normal          Behavior: Behavior normal          Thought Content:  Thought content normal          Judgment: Judgment normal       no conversational dyspnea or audible wheezing noted    Lab Review:   not applicable  Results Reviewed     None           Diagnostics:  No orders to display           Electronically Signed by RAUL Ventura    I spent 26 minutes with patient today in which >10 minutes of the time was spent in counseling/coordination of care regarding tobacco cessation

## 2021-08-03 ENCOUNTER — OFFICE VISIT (OUTPATIENT)
Dept: PULMONOLOGY | Facility: CLINIC | Age: 61
End: 2021-08-03

## 2021-08-03 VITALS
OXYGEN SATURATION: 98 % | HEART RATE: 107 BPM | SYSTOLIC BLOOD PRESSURE: 116 MMHG | HEIGHT: 64 IN | BODY MASS INDEX: 31.58 KG/M2 | WEIGHT: 185 LBS | DIASTOLIC BLOOD PRESSURE: 68 MMHG | TEMPERATURE: 97.5 F

## 2021-08-03 DIAGNOSIS — J44.9 CHRONIC OBSTRUCTIVE PULMONARY DISEASE, UNSPECIFIED COPD TYPE (HCC): Primary | ICD-10-CM

## 2021-08-03 DIAGNOSIS — R05.9 COUGH: ICD-10-CM

## 2021-08-03 DIAGNOSIS — R55 VASOVAGAL SYNCOPE: ICD-10-CM

## 2021-08-03 DIAGNOSIS — Z72.0 TOBACCO USE: ICD-10-CM

## 2021-08-03 PROCEDURE — 99215 OFFICE O/P EST HI 40 MIN: CPT | Performed by: INTERNAL MEDICINE

## 2021-08-03 RX ORDER — GABAPENTIN 100 MG/1
300 CAPSULE ORAL 2 TIMES DAILY
Qty: 180 CAPSULE | Refills: 3 | Status: SHIPPED | OUTPATIENT
Start: 2021-08-03 | End: 2021-12-13 | Stop reason: SDUPTHER

## 2021-08-03 NOTE — ASSESSMENT & PLAN NOTE
Patient has moderate to severe COPD based upon symptoms  Has not had any recent pulmonary function tests  Due to the cost   However has not had any recent exacerbations  Has had improvement in her symptoms since starting Trelegy ( has use highest dose)  Reports improvement of her cough as well  Plan   Continue current medications   Lung cancer screening recommended    Patient states that she will think about it and try to achieve if she can pay for it  Given samples of breztri today   Smoking cessation recommended  Follow-up in 4 months

## 2021-08-03 NOTE — PROGRESS NOTES
Pulmonary Follow Up Note   Cesar Beauchamp 62 y o  female MRN: [id number]  8/3/2021      Assessment:    COPD (chronic obstructive pulmonary disease) ([facility] )    Patient has moderate to severe COPD based upon symptoms  Has not had any recent pulmonary function tests  Due to the cost   However has not had any recent exacerbations  Has had improvement in her symptoms since starting Trelegy ( has use highest dose)  Reports improvement of her cough as well  Plan   Continue current medications   Lung cancer screening recommended  Patient states that she will think about it and try to achieve if she can pay for it  Given samples of breztri today   Smoking cessation recommended  Follow-up in 4 months    Cough  Multifactorial and related to underlying COPD, GERD, upper airway cough syndrome  Has had improvement since initiation of gabapentin  Plan  Continue COPD medications  Continue gabapentin 300 mg b i d  Pulmonary nodules  Discussed follow-up CT scan lung cancer screening CT  Tobacco use  Smoking cessation recommended  Patient was previously referred to formal program   Will continue to encourage her to try to quit  Plan:    Diagnoses and all orders for this visit:    Chronic obstructive pulmonary disease, unspecified COPD type ([facility] )  -     gabapentin (NEURONTIN) 100 mg capsule; Take 3 capsules (300 mg total) by mouth 2 (two) times a day    Cough    Vasovagal syncope  -     gabapentin (NEURONTIN) 100 mg capsule; Take 3 capsules (300 mg total) by mouth 2 (two) times a day    Tobacco use        No follow-ups on file  History of Present Illness   HPI:  Cesar Beauchamp is a 62 y o  female history of moderate COPD  She presents today for routine follow-up  States that she has been doing fairly well  Trelegy has helped  Also gabapentin has helped her cough  She did increase her gabapentin to 300 mg twice daily  She has not had any syncopal events in several days      She has not had any recent exacerbations  She was referred to smoking cessation but states that she is still having trouble quitting  But has cut down to approximately 10 cigarettes per day  Still having some difficulty affording medications  Interested in lung cancer screening but at this time not able to afford it  Otherwise does not have any complaints today  Review of Systems   Constitutional: Negative for chills, fatigue and fever  HENT: Negative for congestion, nosebleeds, postnasal drip, rhinorrhea, sinus pressure and sore throat  Eyes: Negative for discharge, redness and itching  Respiratory: Positive for cough, shortness of breath and wheezing  Negative for choking, chest tightness and stridor  Cardiovascular: Negative for chest pain, palpitations and leg swelling  Gastrointestinal: Negative for blood in stool  Genitourinary: Negative for difficulty urinating and dysuria  Musculoskeletal: Negative for arthralgias, joint swelling and myalgias  Skin: Negative for color change and rash  Neurological: Negative for light-headedness and headaches  Hematological: Negative for adenopathy  Historical Information   Past Medical History:   Diagnosis Date    COPD (chronic obstructive pulmonary disease) ([facility] )      History reviewed  No pertinent surgical history    Family History   Problem Relation Age of Onset    Alzheimer's disease Father          Meds/Allergies     Current Outpatient Medications:     albuterol (2 5 mg/3 mL) 0 083 % nebulizer solution, Take 1 vial (2 5 mg total) by nebulization every 6 (six) hours as needed for wheezing or shortness of breath, Disp: 30 vial, Rfl: 4    albuterol (PROVENTIL HFA,VENTOLIN HFA) 90 mcg/act inhaler, Inhale 2 puffs every 6 (six) hours as needed for wheezing, Disp: 1 Inhaler, Rfl: 3    benzonatate (TESSALON PERLES) 100 mg capsule, Take 1 capsule (100 mg total) by mouth 3 (three) times a day as needed for cough, Disp: 90 capsule, Rfl: 0    buPROPion (Wellbutrin SR) 150 mg 12 hr tablet, 150 mg by mouth daily for 3 days then increase dose to 150 mg by mouth twice daily, Disp: 60 tablet, Rfl: 2    fluticasone-umeclidinium-vilanterol (TRELEGY) 100-62 5-25 MCG/INH inhaler, Inhale 1 puff daily Rinse mouth after use , Disp: 1 Inhaler, Rfl: 5    gabapentin (NEURONTIN) 100 mg capsule, Take 3 capsules (300 mg total) by mouth 2 (two) times a day, Disp: 180 capsule, Rfl: 3    fluticasone-salmeterol (ADVAIR, WIXELA) 250-50 mcg/dose inhaler, Inhale 1 puff 2 (two) times a day Rinse mouth after use  (Patient not taking: Reported on 6/1/2021), Disp: 1 Inhaler, Rfl: 3    mometasone (NASONEX) 50 mcg/act nasal spray, 2 sprays into each nostril daily (Patient not taking: Reported on 6/1/2021), Disp: 1 Act, Rfl: 3  No Known Allergies    Vitals: Blood pressure 116/68, pulse (!) 107, temperature 97 5 °F (36 4 °C), temperature source Tympanic, height 5' 4" (1 626 m), weight 83 9 kg (185 lb), SpO2 98 %  Body mass index is 31 76 kg/m²  Oxygen Therapy  SpO2: 98 %  Oxygen Therapy: None (Room air)      Physical Exam  Physical Exam  Vitals reviewed  Constitutional:       General: She is not in acute distress  Appearance: Normal appearance  She is well-developed and normal weight  She is not ill-appearing, toxic-appearing or diaphoretic  HENT:      Head: Normocephalic and atraumatic  Right Ear: External ear normal       Left Ear: External ear normal       Nose: Nose normal  No congestion or rhinorrhea  Mouth/Throat:      Pharynx: No oropharyngeal exudate  Eyes:      General: No scleral icterus  Right eye: No discharge  Left eye: No discharge  Conjunctiva/sclera: Conjunctivae normal       Pupils: Pupils are equal, round, and reactive to light  Neck:      Trachea: No tracheal deviation  Cardiovascular:      Rate and Rhythm: Normal rate and regular rhythm  Heart sounds: Normal heart sounds  No murmur heard  No friction rub  No gallop  Pulmonary:      Effort: Pulmonary effort is normal       Breath sounds: Normal breath sounds  No stridor  No wheezing or rales  Musculoskeletal:      Cervical back: Normal range of motion  Right lower leg: No edema  Left lower leg: No edema  Lymphadenopathy:      Head:      Right side of head: No submental, submandibular, preauricular or posterior auricular adenopathy  Left side of head: No submental, submandibular, preauricular or posterior auricular adenopathy  Cervical: No cervical adenopathy  Skin:     General: Skin is warm and dry  Findings: No lesion or rash  Neurological:      Mental Status: She is alert and oriented to person, place, and time  Labs:   None recently     Imaging and other studies:   Outside records reviewed from previous pulmonologist  Last seen in 2016  CT imaging at that time showed stable pulmonary nodules  Pulmonary function testing:   None recently   EKG, Pathology, and Other Studies:     JIM Ann    [facility]  Answers for HPI/ROS submitted by the patient on 7/12/2021  Do you have chest tightness?: Yes  Do you have difficulty breathing?: Yes  Do you experience frequent throat clearing?: Yes  Do you have a hoarse voice?: Yes  Do you have a wet cough?: Yes  Chronicity: chronic  When did you first notice your symptoms?: more than 1 year ago  How often do your symptoms occur?: constantly  Since you first noticed this problem, how has it changed?: unchanged  Do you have shortness of breath that occurs with effort or exertion?: Yes  Do you have ear congestion?: No  Do you have heartburn?: Yes  Do you have fatigue?: Yes  Do you have nasal congestion?: No  Do you have shortness of breath when lying flat?: Yes  Do you have shortness of breath when you wake up?: Yes  Which of the following makes your symptoms worse?: climbing stairs, eating, lying down  Which of the following makes your symptoms better?: change in position  Risk factors for lung disease: smoking/tobacco exposure

## 2021-08-03 NOTE — ASSESSMENT & PLAN NOTE
Smoking cessation recommended  Patient was previously referred to formal program   Will continue to encourage her to try to quit

## 2021-08-30 ENCOUNTER — TELEPHONE (OUTPATIENT)
Dept: PULMONOLOGY | Facility: CLINIC | Age: 61
End: 2021-08-30

## 2021-08-30 DIAGNOSIS — J44.9 CHRONIC OBSTRUCTIVE PULMONARY DISEASE, UNSPECIFIED COPD TYPE (HCC): ICD-10-CM

## 2021-08-30 DIAGNOSIS — R05.9 COUGH: Primary | ICD-10-CM

## 2021-08-30 RX ORDER — FLUTICASONE FUROATE, UMECLIDINIUM BROMIDE AND VILANTEROL TRIFENATATE 100; 62.5; 25 UG/1; UG/1; UG/1
1 POWDER RESPIRATORY (INHALATION) DAILY
Qty: 180 BLISTER | Refills: 3 | Status: SHIPPED | OUTPATIENT
Start: 2021-08-30 | End: 2021-12-13 | Stop reason: SDUPTHER

## 2021-08-30 NOTE — TELEPHONE ENCOUNTER
Emailed signed script to pt per her request  Informed her I do not have any Trelegy samples in [place] or [place]

## 2021-08-30 NOTE — TELEPHONE ENCOUNTER
Patient is calling, she stated she needs a new script for a refill on her Trelegy  She is part of the [address] [place] [address] program  She would need to  the printed script from the [facility]  She also stated she would like to  2 samples of Trelegy as well when she picks up the script  I am unsure if this is possible to give her samples or not  Please advise

## 2021-08-31 ENCOUNTER — TELEMEDICINE (OUTPATIENT)
Dept: PULMONOLOGY | Facility: CLINIC | Age: 61
End: 2021-08-31

## 2021-08-31 DIAGNOSIS — Z72.0 TOBACCO ABUSE DISORDER: Primary | ICD-10-CM

## 2021-08-31 PROCEDURE — 99406 BEHAV CHNG SMOKING 3-10 MIN: CPT | Performed by: PHYSICIAN ASSISTANT

## 2021-08-31 NOTE — PROGRESS NOTES
Virtual Visit with Tobacco Cessation    Verification of patient location:    Patient is located in the following state in which I hold an active license [place]    Problem List Items Addressed This Visit     None          Reason for visit is smoking cessation    Encounter provider Horacio Wilburn PA-C    Provider located at [address] [address]  [facility] [address]  [phone]      Recent Visits  No visits were found meeting these conditions  Showing recent visits within past 7 days and meeting all other requirements  Future Appointments  No visits were found meeting these conditions  Showing future appointments within next 150 days and meeting all other requirements       The patient was identified by name and date of birth  Romain Comer was informed that this is a telemedicine visit and that the visit is being conducted throughtelephone  My office door was closed  No one else was in the room  She acknowledged consent and understanding of privacy and security of the platform  Romainyogi Comer verbally agrees to participate in [employer]  Pt is aware that [employer] could be limited without vital signs or the ability to perform a full hands-on physical Linda Germain understands she or the provider may request at any time to terminate the video visit and request the patient to seek care or treatment in person  Patient is aware this is a billable service  Jamarcus Birmingham is a 62 y o  female was called for a smoking cessation follow-up  Patient has been followed up before for her smoking cessation  Unfortunately she has not started or attempted to quit yet  She reports that she has been very busy over the summer    She has not started to use any of the modalities that were prescribed or offered to her during her previous office visits and smoking cessation visit   She does report that she has set a quit date for late September with her sister  In this meantime she has worked down from 1 pack per day to 5 cigarettes a day  She does reports that she would like to follow-up mid November prior to the holidays to see how her smoking cessation efforts are going  She needs a reminder prior to this appointment to ensure that she is in her office for the appointment call  Her main barrier to quitting smoking is stress and she does report multiple activities over the summer and continued stress       Down to 5 a day cigarettes     Follow-up in November for another smoking cessation visit      HPI     Past Medical History:   Diagnosis Date    COPD (chronic obstructive pulmonary disease) ([facility] )        No past surgical history on file      Social History     Tobacco Use   Smoking Status Current Every Day Smoker    Packs/day: 1 00    Years: 47 00    Pack years: 47 00    Types: Cigarettes    Start date: 1974   Smokeless Tobacco Never Used   Tobacco Comment    1 PACK DAILY AS OF 6/2021       E-Cigarette/Vaping    E-Cigarette Use Never User      E-Cigarette/Vaping Substances    Nicotine No     THC No     CBD No     Flavoring No     Other No     Unknown No        Current Outpatient Medications   Medication Sig Dispense Refill    albuterol (2 5 mg/3 mL) 0 083 % nebulizer solution Take 1 vial (2 5 mg total) by nebulization every 6 (six) hours as needed for wheezing or shortness of breath 30 vial 4    albuterol (PROVENTIL HFA,VENTOLIN HFA) 90 mcg/act inhaler Inhale 2 puffs every 6 (six) hours as needed for wheezing 1 Inhaler 3    benzonatate (TESSALON PERLES) 100 mg capsule Take 1 capsule (100 mg total) by mouth 3 (three) times a day as needed for cough 90 capsule 0    buPROPion (Wellbutrin SR) 150 mg 12 hr tablet 150 mg by mouth daily for 3 days then increase dose to 150 mg by mouth twice daily 60 tablet 2    fluticasone-salmeterol (ADVAIR, WIXELA) 250-50 mcg/dose inhaler Inhale 1 puff 2 (two) times a day Rinse mouth after use  (Patient not taking: Reported on 6/1/2021) 1 Inhaler 3    fluticasone-umeclidinium-vilanterol (Trelegy Ellipta) 100-62 5-25 MCG/INH inhaler Inhale 1 puff daily Rinse mouth after use  180 blister 3    fluticasone-umeclidinium-vilanterol (TRELEGY) 100-62 5-25 MCG/INH inhaler Inhale 1 puff daily Rinse mouth after use  1 Inhaler 5    gabapentin (NEURONTIN) 100 mg capsule Take 3 capsules (300 mg total) by mouth 2 (two) times a day 180 capsule 3    mometasone (NASONEX) 50 mcg/act nasal spray 2 sprays into each nostril daily (Patient not taking: Reported on 6/1/2021) 1 Act 3     No current facility-administered medications for this visit  No Known Allergies    Review of Systems   This is a telephone visit    Video Exam    There were no vitals filed for this visit  Physical Exam   This is a telephone visit    Tobacco Use/Cessation  I assessed Stevie Larios to be in a pre-contemplative stage with respect to tobacco use  I advised patient to quit, and offered support  Discussed current use pattern  Braden Eddy is a 62 y o  female here for a discussion regarding smoking cessation  She began smoking 40 years ago  She currently smokes 5 cigarettes per day  She has attempted to quit smoking in the past, most recently several Years ago  Best success quitting using willpower  Barriers to quitting include: friends smoke, spouse/partner smokes and under a lot of stress now  She denies chest pain, hemoptysis, non productive cough and productive cough  As a result of this visit, I have referred the patient for further respiratory evaluation   No    I spent 10 minutes with patient today in which >10 minutes of the time was spent in counseling/coordination of care regarding tobacco cessation

## 2021-09-17 DIAGNOSIS — R05.9 COUGH: ICD-10-CM

## 2021-09-17 DIAGNOSIS — R55 VASOVAGAL SYNCOPE: ICD-10-CM

## 2021-09-17 DIAGNOSIS — J44.9 CHRONIC OBSTRUCTIVE PULMONARY DISEASE, UNSPECIFIED COPD TYPE (HCC): ICD-10-CM

## 2021-09-17 RX ORDER — GABAPENTIN 100 MG/1
300 CAPSULE ORAL 2 TIMES DAILY
Qty: 180 CAPSULE | Refills: 0 | Status: CANCELLED | OUTPATIENT
Start: 2021-09-17

## 2021-09-17 RX ORDER — BENZONATATE 100 MG/1
100 CAPSULE ORAL 3 TIMES DAILY PRN
Qty: 90 CAPSULE | Refills: 0 | Status: SHIPPED | OUTPATIENT
Start: 2021-09-17 | End: 2022-04-18

## 2021-10-15 DIAGNOSIS — J44.9 CHRONIC OBSTRUCTIVE PULMONARY DISEASE, UNSPECIFIED COPD TYPE (HCC): ICD-10-CM

## 2021-10-16 RX ORDER — ALBUTEROL SULFATE 2.5 MG/3ML
2.5 SOLUTION RESPIRATORY (INHALATION) EVERY 6 HOURS PRN
Qty: 360 ML | Refills: 5 | Status: SHIPPED | OUTPATIENT
Start: 2021-10-16

## 2021-12-13 ENCOUNTER — OFFICE VISIT (OUTPATIENT)
Dept: PULMONOLOGY | Facility: CLINIC | Age: 61
End: 2021-12-13

## 2021-12-13 VITALS
SYSTOLIC BLOOD PRESSURE: 120 MMHG | WEIGHT: 180.2 LBS | BODY MASS INDEX: 30.77 KG/M2 | DIASTOLIC BLOOD PRESSURE: 82 MMHG | OXYGEN SATURATION: 100 % | RESPIRATION RATE: 16 BRPM | TEMPERATURE: 96.7 F | HEART RATE: 73 BPM | HEIGHT: 64 IN

## 2021-12-13 DIAGNOSIS — J44.9 CHRONIC OBSTRUCTIVE PULMONARY DISEASE, UNSPECIFIED COPD TYPE (HCC): ICD-10-CM

## 2021-12-13 DIAGNOSIS — R05.9 COUGH: ICD-10-CM

## 2021-12-13 DIAGNOSIS — R55 VASOVAGAL SYNCOPE: ICD-10-CM

## 2021-12-13 PROCEDURE — 99214 OFFICE O/P EST MOD 30 MIN: CPT | Performed by: INTERNAL MEDICINE

## 2021-12-13 RX ORDER — GABAPENTIN 100 MG/1
300 CAPSULE ORAL 2 TIMES DAILY
Qty: 180 CAPSULE | Refills: 3 | Status: SHIPPED | OUTPATIENT
Start: 2021-12-13 | End: 2022-04-18

## 2021-12-13 RX ORDER — GABAPENTIN 100 MG/1
100 CAPSULE ORAL 3 TIMES DAILY
Qty: 7 CAPSULE | Refills: 0 | Status: SHIPPED | OUTPATIENT
Start: 2021-12-13 | End: 2022-06-03 | Stop reason: SDUPTHER

## 2021-12-13 RX ORDER — FLUTICASONE FUROATE, UMECLIDINIUM BROMIDE AND VILANTEROL TRIFENATATE 100; 62.5; 25 UG/1; UG/1; UG/1
1 POWDER RESPIRATORY (INHALATION) DAILY
Qty: 180 BLISTER | Refills: 3 | Status: SHIPPED | OUTPATIENT
Start: 2021-12-13 | End: 2022-01-31

## 2022-01-31 DIAGNOSIS — R05.9 COUGH: ICD-10-CM

## 2022-01-31 DIAGNOSIS — J44.9 CHRONIC OBSTRUCTIVE PULMONARY DISEASE, UNSPECIFIED COPD TYPE (HCC): ICD-10-CM

## 2022-02-01 RX ORDER — FLUTICASONE FUROATE, UMECLIDINIUM BROMIDE AND VILANTEROL TRIFENATATE 100; 62.5; 25 UG/1; UG/1; UG/1
1 POWDER RESPIRATORY (INHALATION) DAILY
Qty: 180 BLISTER | Refills: 3 | Status: SHIPPED | OUTPATIENT
Start: 2022-02-01

## 2022-02-14 DIAGNOSIS — J44.9 CHRONIC OBSTRUCTIVE PULMONARY DISEASE, UNSPECIFIED COPD TYPE (HCC): ICD-10-CM

## 2022-02-14 RX ORDER — ALBUTEROL SULFATE 90 UG/1
2 AEROSOL, METERED RESPIRATORY (INHALATION) EVERY 6 HOURS PRN
Qty: 18 G | Refills: 5 | Status: SHIPPED | OUTPATIENT
Start: 2022-02-14

## 2022-02-16 NOTE — TELEPHONE ENCOUNTER
Pt will  Trelegy script for [facility] assistance program and 1 Trelegy sample in [facility] tomorrow

## 2022-03-29 ENCOUNTER — TELEPHONE (OUTPATIENT)
Dept: PULMONOLOGY | Facility: CLINIC | Age: 62
End: 2022-03-29

## 2022-03-29 NOTE — TELEPHONE ENCOUNTER
Tried calling pt, the number in her chart is for staffing agency so I did not leave a voicemail on that line  The pt does not have insurance and can call [facility] to check the status of her application [phone]  We have provided pt with 2 printed scripts for the Trelegy to submit to [employer] with her application

## 2022-03-29 NOTE — TELEPHONE ENCOUNTER
Patient is calling, she stated [facility] still has not got her Trelegy medication and has been out of it for over a month  She is hoping she can  a sample and help figure out what the issue is with [address] [place] [address]   Please advise

## 2022-03-31 NOTE — TELEPHONE ENCOUNTER
PT came in to the office today to  the sample of Thermon Neda also brought back the application form for [address] [place] [address] and the printed script and asked if we could fax it again because they stated that they never received it   I told the patient that we could fax it again and advised her that we are not responsible for anything after and also advised her to make a copy and to mail it in again to [address] [place] [address]     Application faxed over again 3/31/2022

## 2022-04-18 ENCOUNTER — PATIENT OUTREACH (OUTPATIENT)
Dept: FAMILY MEDICINE CLINIC | Facility: CLINIC | Age: 62
End: 2022-04-18

## 2022-04-18 ENCOUNTER — OFFICE VISIT (OUTPATIENT)
Dept: FAMILY MEDICINE CLINIC | Facility: CLINIC | Age: 62
End: 2022-04-18

## 2022-04-18 VITALS
HEIGHT: 64 IN | SYSTOLIC BLOOD PRESSURE: 120 MMHG | WEIGHT: 178.2 LBS | TEMPERATURE: 97.8 F | BODY MASS INDEX: 30.42 KG/M2 | DIASTOLIC BLOOD PRESSURE: 82 MMHG

## 2022-04-18 DIAGNOSIS — Z72.0 TOBACCO USE: ICD-10-CM

## 2022-04-18 DIAGNOSIS — F41.9 ANXIETY: ICD-10-CM

## 2022-04-18 DIAGNOSIS — E78.2 MIXED HYPERLIPIDEMIA: ICD-10-CM

## 2022-04-18 DIAGNOSIS — R55 VASOVAGAL SYNCOPE: ICD-10-CM

## 2022-04-18 DIAGNOSIS — Z12.31 ENCOUNTER FOR SCREENING MAMMOGRAM FOR MALIGNANT NEOPLASM OF BREAST: ICD-10-CM

## 2022-04-18 DIAGNOSIS — J44.9 CHRONIC OBSTRUCTIVE PULMONARY DISEASE, UNSPECIFIED COPD TYPE (HCC): Primary | ICD-10-CM

## 2022-04-18 DIAGNOSIS — R92.2 DENSE BREAST TISSUE ON MAMMOGRAM: ICD-10-CM

## 2022-04-18 DIAGNOSIS — K21.9 GASTROESOPHAGEAL REFLUX DISEASE, UNSPECIFIED WHETHER ESOPHAGITIS PRESENT: ICD-10-CM

## 2022-04-18 DIAGNOSIS — Z59.89 UNINSURED: ICD-10-CM

## 2022-04-18 PROBLEM — R92.30 DENSE BREAST TISSUE ON MAMMOGRAM: Status: ACTIVE | Noted: 2022-04-18

## 2022-04-18 PROBLEM — E78.5 HYPERLIPIDEMIA: Status: ACTIVE | Noted: 2017-05-16

## 2022-04-18 PROBLEM — R05.9 COUGH: Status: RESOLVED | Noted: 2020-01-17 | Resolved: 2022-04-18

## 2022-04-18 PROBLEM — R07.82 INTERCOSTAL PAIN: Status: RESOLVED | Noted: 2020-01-17 | Resolved: 2022-04-18

## 2022-04-18 PROBLEM — Z59.71 UNINSURED: Status: ACTIVE | Noted: 2022-04-18

## 2022-04-18 PROCEDURE — 99204 OFFICE O/P NEW MOD 45 MIN: CPT | Performed by: FAMILY MEDICINE

## 2022-04-18 RX ORDER — SERTRALINE HYDROCHLORIDE 100 MG/1
100 TABLET, FILM COATED ORAL DAILY
COMMUNITY
End: 2022-04-18

## 2022-04-18 RX ORDER — ALPRAZOLAM 0.25 MG/1
TABLET ORAL
COMMUNITY
End: 2022-04-18

## 2022-04-18 RX ORDER — FAMOTIDINE 40 MG/1
40 TABLET, FILM COATED ORAL DAILY
Qty: 30 TABLET | Refills: 3 | Status: SHIPPED | OUTPATIENT
Start: 2022-04-18

## 2022-04-18 SDOH — ECONOMIC STABILITY - INCOME SECURITY: OTHER PROBLEMS RELATED TO HOUSING AND ECONOMIC CIRCUMSTANCES: Z59.89

## 2022-04-18 NOTE — ASSESSMENT & PLAN NOTE
Continue inhalers per pulmonary Patient to contact health bureau for possibility of shots and also screening for lung cancer  I have also added referral for

## 2022-04-18 NOTE — PATIENT INSTRUCTIONS
Obesity   AMBULATORY CARE:   Obesity  means your body mass index (BMI) is greater than 30  Your healthcare provider will use your height and weight to measure your BMI  The risks of obesity include  many health problems, including injuries or physical disability  · Diabetes (high blood sugar level)    · High blood pressure or high cholesterol    · Heart disease    · Stroke    · Gallbladder or liver disease    · Cancer of the colon, breast, prostate, liver, or kidney    · Sleep apnea    · Arthritis or gout    Screening  is done to check for health conditions before you have signs or symptoms  If you are 28to 79years old, your blood sugar level may be checked every 3 years for signs of prediabetes or diabetes  Your healthcare provider will check your blood pressure at each visit  High blood pressure can lead to a stroke or other problems  Your provider may check for signs of heart disease, cancer, or other health problems  Seek care immediately if:   · You have a severe headache, confusion, or difficulty speaking  · You have weakness on one side of your body  · You have chest pain, sweating, or shortness of breath  Call your doctor if:   · You have symptoms of gallbladder or liver disease, such as pain in your upper abdomen  · You have knee or hip pain and discomfort while walking  · You have symptoms of diabetes, such as intense hunger and thirst, and frequent urination  · You have symptoms of sleep apnea, such as snoring or daytime sleepiness  · You have questions or concerns about your condition or care  Treatment for obesity  focuses on helping you lose weight to improve your health  Even a small decrease in BMI can reduce the risk for many health problems  Your healthcare provider will help you set a weight-loss goal   · Lifestyle changes  are the first step in treating obesity  These include making healthy food choices and getting regular physical activity   Your healthcare provider may suggest a weight-loss program that involves coaching, education, and therapy  · Medicine  may help you lose weight when it is used with a healthy foods and physical activity  · Surgery  can help you lose weight if you are very obese and have other health problems  There are several types of weight-loss surgery  Ask your healthcare provider for more information  Tips for safe weight loss:   · Set small, realistic goals  An example of a small goal is to walk for 20 minutes 5 days a week  Anther goal is to lose 5% of your body weight  · Tell friends, family members, and coworkers about your goals  and ask for their support  Ask a friend to lose weight with you, or join a weight-loss support group  · Identify foods or triggers that may cause you to overeat , and find ways to avoid them  Remove tempting high-calorie foods from your home and workplace  Place a bowl of fresh fruit on your kitchen counter  If stress causes you to eat, then find other ways to cope with stress  A counselor or therapist may be able to help you  · Keep a diary to track what you eat and drink  Also write down how many minutes of physical activity you do each day  Weigh yourself once a week and record it in your diary  Eating changes: You will need to eat 500 to 1,000 fewer calories each day than you currently eat to lose 1 to 2 pounds a week  The following changes will help you cut calories:  · Eat smaller portions  Use small plates, no larger than 9 inches in diameter  Fill your plate half full of fruits and vegetables  Measure your food using measuring cups until you know what a serving size looks like  · Eat 3 meals and 1 or 2 snacks each day  Plan your meals in advance  Emanuel Jaquez and eat at home most of the time  Eat slowly  Do not skip meals  Skipping meals can lead to overeating later in the day  This can make it harder for you to lose weight   Talk with a dietitian to help you make a meal plan and schedule that is right for you  · Eat fruits and vegetables at every meal   They are low in calories and high in fiber, which makes you feel full  Do not add butter, margarine, or cream sauce to vegetables  Use herbs to season steamed vegetables  · Eat less fat and fewer fried foods  Eat more baked or grilled chicken and fish  These protein sources are lower in calories and fat than red meat  Limit fast food  Dress your salads with olive oil and vinegar instead of bottled dressing  · Limit the amount of sugar you eat  Do not drink sugary beverages  Limit alcohol  Activity changes:  Physical activity is good for your body in many ways  It helps you burn calories and build strong muscles  It decreases stress and depression, and improves your mood  It can also help you sleep better  Talk to your healthcare provider before you begin an exercise program   · Exercise for at least 30 minutes 5 days a week  Start slowly  Set aside time each day for physical activity that you enjoy and that is convenient for you  It is best to do both weight training and an activity that increases your heart rate, such as walking, bicycling, or swimming  · Find ways to be more active  Do yard work and housecleaning  Walk up the stairs instead of using elevators  Spend your leisure time going to events that require walking, such as outdoor festivals or fairs  This extra physical activity can help you lose weight and keep it off  Follow up with your doctor as directed: You may need to meet with a dietitian  Write down your questions so you remember to ask them during your visits  © Copyright [employer] 2022 Information is for End User's use only and may not be sold, redistributed or otherwise used for commercial purposes  All illustrations and images included in CareNotes® are the copyrighted property of A [employer] Inc  or Yunior Brunner   The above information is an  only   It is not intended as medical advice for individual conditions or treatments  Talk to your doctor, nurse or pharmacist before following any medical regimen to see if it is safe and effective for you

## 2022-04-18 NOTE — PROGRESS NOTES
OP CM called to number listed for pt but it states its [employer] agency  Will try calling again  I did not want to leave a message on wrong number
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
## 2022-04-18 NOTE — ASSESSMENT & PLAN NOTE
History f cough syncope Patient states is controlled on gabapentin prescribed by pulmonary patient to continue that

## 2022-04-19 ENCOUNTER — PATIENT OUTREACH (OUTPATIENT)
Dept: FAMILY MEDICINE CLINIC | Facility: CLINIC | Age: 62
End: 2022-04-19

## 2022-04-19 NOTE — PROGRESS NOTES
OP CM called to pt in regards to not having insurance  Pt states she gets $15 an hour at [employer]  Pt is over income for medicaid  Explained alicia care to pt and emailed pt the application  Also explained that if approved it is retroactive 200 days so she can call and schedule her appts and let them know she is alicia care pending  Pt states that she resides with her two sisters so they can afford all their bills  Pt drives her own car  Pt has a pulmonologist who helps her with all her COPD medication costs  Pt states she is down to smoking 4-5 cigarettes a day  OP CM will continue to follow

## 2022-06-03 DIAGNOSIS — R05.9 COUGH: ICD-10-CM

## 2022-06-04 RX ORDER — GABAPENTIN 100 MG/1
100 CAPSULE ORAL 3 TIMES DAILY
Qty: 7 CAPSULE | Refills: 0 | Status: SHIPPED | OUTPATIENT
Start: 2022-06-04 | End: 2022-06-06 | Stop reason: SDUPTHER

## 2022-06-13 ENCOUNTER — PATIENT OUTREACH (OUTPATIENT)
Dept: FAMILY MEDICINE CLINIC | Facility: CLINIC | Age: 62
End: 2022-06-13

## 2022-06-28 ENCOUNTER — PATIENT OUTREACH (OUTPATIENT)
Dept: FAMILY MEDICINE CLINIC | Facility: CLINIC | Age: 62
End: 2022-06-28

## 2022-06-28 NOTE — PROGRESS NOTES
Follow up to pt in regards to [facility] care  Pt states she did get mail from [facility] so she will review it and get back to me

## 2022-10-25 DIAGNOSIS — J44.9 CHRONIC OBSTRUCTIVE PULMONARY DISEASE, UNSPECIFIED COPD TYPE (HCC): ICD-10-CM

## 2022-10-26 RX ORDER — ALBUTEROL SULFATE 2.5 MG/3ML
SOLUTION RESPIRATORY (INHALATION)
Qty: 180 ML | Refills: 5 | Status: SHIPPED | OUTPATIENT
Start: 2022-10-26

## 2022-11-25 DIAGNOSIS — J44.9 CHRONIC OBSTRUCTIVE PULMONARY DISEASE, UNSPECIFIED COPD TYPE (HCC): ICD-10-CM

## 2022-11-25 RX ORDER — ALBUTEROL SULFATE 90 UG/1
2 AEROSOL, METERED RESPIRATORY (INHALATION) EVERY 6 HOURS PRN
Qty: 18 G | Refills: 0 | Status: SHIPPED | OUTPATIENT
Start: 2022-11-25

## 2022-12-01 ENCOUNTER — OFFICE VISIT (OUTPATIENT)
Dept: PULMONOLOGY | Facility: CLINIC | Age: 62
End: 2022-12-01

## 2022-12-01 VITALS
BODY MASS INDEX: 29.19 KG/M2 | HEIGHT: 64 IN | SYSTOLIC BLOOD PRESSURE: 158 MMHG | DIASTOLIC BLOOD PRESSURE: 90 MMHG | OXYGEN SATURATION: 94 % | WEIGHT: 171 LBS | HEART RATE: 120 BPM

## 2022-12-01 DIAGNOSIS — J44.9 CHRONIC OBSTRUCTIVE PULMONARY DISEASE, UNSPECIFIED COPD TYPE (HCC): Primary | ICD-10-CM

## 2022-12-01 DIAGNOSIS — J44.1 COPD EXACERBATION (HCC): ICD-10-CM

## 2022-12-01 DIAGNOSIS — Z72.0 TOBACCO USE: ICD-10-CM

## 2022-12-01 RX ORDER — PREDNISONE 10 MG/1
TABLET ORAL
Qty: 30 TABLET | Refills: 0 | Status: SHIPPED | OUTPATIENT
Start: 2022-12-01 | End: 2022-12-13

## 2022-12-01 RX ORDER — BENZONATATE 200 MG/1
200 CAPSULE ORAL 3 TIMES DAILY PRN
Qty: 21 CAPSULE | Refills: 0 | Status: SHIPPED | OUTPATIENT
Start: 2022-12-01

## 2022-12-01 NOTE — LETTER
December 1, 2022     Patient: Caitlin Hernandez  YOB: 1960  Date of Visit: 12/1/2022      To Whom it May Concern:    Caitlin Hernandez is under my professional care  Sivantremayne Servin was seen in my office on 12/1/2022  Sivantremayne Servin may return to work on 12/2/2022  If you have any questions or concerns, please don't hesitate to call           Sincerely,          César Shah MD        CC: No Recipients
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
## 2022-12-01 NOTE — ASSESSMENT & PLAN NOTE
Patient presents with an acute exacerbation of her COPD likely precipitated by recent viral infection (presumably influenza,differential diagnosis include COVID, RSV and other viral pathogens)  She appears very fatigued and 1 point was lying on the examination table  She was coughing throughout the appointment  However her lungs did not have any significant wheezing, rales or rhonchi  Her symptoms have been present for approximately 1 week but she is afebrile today  Will treat for COPD exacerbation bronchitis  I did recommend that she go to the urgent care today but she would like to defer  I strongly recommend that she go to the urgent care ER if a therapies prescribed today are ineffective  Plan   Continue Trelegy Ellipta and albuterol  Have started prednisone taper 40 milligrams and taper by 10 milligrams every 3 days until complete   [employer]  Given work note for today  Can return to work tomorrow she feels better   She will follow up for routine care in 3 months and discuss chronic management of copd

## 2023-03-09 DIAGNOSIS — J44.9 CHRONIC OBSTRUCTIVE PULMONARY DISEASE, UNSPECIFIED COPD TYPE (HCC): ICD-10-CM

## 2023-03-09 DIAGNOSIS — J44.9 CHRONIC OBSTRUCTIVE PULMONARY DISEASE, UNSPECIFIED COPD TYPE (HCC): Primary | ICD-10-CM

## 2023-03-09 DIAGNOSIS — R05.9 COUGH: ICD-10-CM

## 2023-03-09 RX ORDER — FLUTICASONE FUROATE, UMECLIDINIUM BROMIDE AND VILANTEROL TRIFENATATE 100; 62.5; 25 UG/1; UG/1; UG/1
1 POWDER RESPIRATORY (INHALATION) DAILY
Qty: 60 BLISTER | Refills: 5 | Status: SHIPPED | OUTPATIENT
Start: 2023-03-09 | End: 2023-04-08

## 2023-03-26 DIAGNOSIS — J44.9 CHRONIC OBSTRUCTIVE PULMONARY DISEASE, UNSPECIFIED COPD TYPE (HCC): ICD-10-CM

## 2023-03-27 RX ORDER — ALBUTEROL SULFATE 90 UG/1
2 AEROSOL, METERED RESPIRATORY (INHALATION) EVERY 6 HOURS PRN
Qty: 18 G | Refills: 0 | Status: SHIPPED | OUTPATIENT
Start: 2023-03-27

## 2023-03-28 RX ORDER — FLUTICASONE FUROATE, UMECLIDINIUM BROMIDE AND VILANTEROL TRIFENATATE 100; 62.5; 25 UG/1; UG/1; UG/1
1 POWDER RESPIRATORY (INHALATION) DAILY
Qty: 60 BLISTER | Refills: 0 | Status: SHIPPED | OUTPATIENT
Start: 2023-03-28 | End: 2023-04-27

## 2023-05-09 ENCOUNTER — OFFICE VISIT (OUTPATIENT)
Dept: PULMONOLOGY | Facility: CLINIC | Age: 63
End: 2023-05-09

## 2023-05-09 VITALS
SYSTOLIC BLOOD PRESSURE: 122 MMHG | DIASTOLIC BLOOD PRESSURE: 68 MMHG | BODY MASS INDEX: 28.17 KG/M2 | HEIGHT: 64 IN | WEIGHT: 165 LBS | OXYGEN SATURATION: 96 % | HEART RATE: 110 BPM | RESPIRATION RATE: 18 BRPM | TEMPERATURE: 96.7 F

## 2023-05-09 DIAGNOSIS — R91.8 PULMONARY NODULES: ICD-10-CM

## 2023-05-09 DIAGNOSIS — Z72.0 TOBACCO USE: Primary | ICD-10-CM

## 2023-05-09 DIAGNOSIS — J44.9 CHRONIC OBSTRUCTIVE PULMONARY DISEASE, UNSPECIFIED COPD TYPE (HCC): ICD-10-CM

## 2023-05-09 NOTE — ASSESSMENT & PLAN NOTE
COPD is stable  She has not had any recent exacerbations  However has chronic shortness of breath, chest tightness and wheezing  She states that when she can afford her Trelegy her symptoms are much better controlled  She has previously applied for medication assistance but was declined  She therefore tries a budget for her Trelegy  She picked up her prescription today  Plan  Continue Trelegy Ellipta  Continue albuterol  Discussed risk and benefits of lung cancer screening  Patient would like to proceed with CT scan
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
## 2023-05-09 NOTE — ASSESSMENT & PLAN NOTE
Patient has cut down her tobacco use to 1/2 pack/day  However she does not desire to quit completely  We will continue to readdress

## 2023-05-25 ENCOUNTER — HOSPITAL ENCOUNTER (OUTPATIENT)
Dept: RADIOLOGY | Facility: HOSPITAL | Age: 63
Discharge: HOME | End: 2023-05-25
Attending: INTERNAL MEDICINE

## 2023-05-25 DIAGNOSIS — Z72.0 TOBACCO USE: ICD-10-CM

## 2023-05-25 DIAGNOSIS — J44.9 CHRONIC OBSTRUCTIVE PULMONARY DISEASE, UNSPECIFIED COPD TYPE (HCC): ICD-10-CM

## 2023-07-11 ENCOUNTER — OFFICE VISIT (OUTPATIENT)
Dept: FAMILY MEDICINE CLINIC | Facility: CLINIC | Age: 63
End: 2023-07-11
Payer: COMMERCIAL

## 2023-07-11 VITALS
OXYGEN SATURATION: 98 % | WEIGHT: 158 LBS | HEIGHT: 63 IN | HEART RATE: 96 BPM | DIASTOLIC BLOOD PRESSURE: 80 MMHG | BODY MASS INDEX: 28 KG/M2 | TEMPERATURE: 99.7 F | SYSTOLIC BLOOD PRESSURE: 120 MMHG | RESPIRATION RATE: 16 BRPM

## 2023-07-11 DIAGNOSIS — Z13.220 SCREENING FOR CHOLESTEROL LEVEL: ICD-10-CM

## 2023-07-11 DIAGNOSIS — Z13.1 SCREENING FOR DIABETES MELLITUS: ICD-10-CM

## 2023-07-11 DIAGNOSIS — Z13.228 SCREENING FOR METABOLIC DISORDER: ICD-10-CM

## 2023-07-11 DIAGNOSIS — Z13.89 SCREENING FOR BLOOD OR PROTEIN IN URINE: ICD-10-CM

## 2023-07-11 DIAGNOSIS — Z12.11 SCREENING FOR COLON CANCER: ICD-10-CM

## 2023-07-11 DIAGNOSIS — F17.210 CIGARETTE NICOTINE DEPENDENCE WITHOUT COMPLICATION: ICD-10-CM

## 2023-07-11 DIAGNOSIS — E55.9 VITAMIN D DEFICIENCY: ICD-10-CM

## 2023-07-11 DIAGNOSIS — Z13.0 SCREENING FOR BLOOD DISEASE: ICD-10-CM

## 2023-07-11 DIAGNOSIS — K21.9 GASTROESOPHAGEAL REFLUX DISEASE, UNSPECIFIED WHETHER ESOPHAGITIS PRESENT: ICD-10-CM

## 2023-07-11 DIAGNOSIS — Z13.29 SCREENING FOR THYROID DISORDER: ICD-10-CM

## 2023-07-11 DIAGNOSIS — F41.9 ANXIETY DISORDER, UNSPECIFIED TYPE: ICD-10-CM

## 2023-07-11 DIAGNOSIS — Z12.31 ENCOUNTER FOR SCREENING MAMMOGRAM FOR MALIGNANT NEOPLASM OF BREAST: ICD-10-CM

## 2023-07-11 DIAGNOSIS — Z00.00 ANNUAL PHYSICAL EXAM: Primary | ICD-10-CM

## 2023-07-11 DIAGNOSIS — J44.9 CHRONIC OBSTRUCTIVE PULMONARY DISEASE, UNSPECIFIED COPD TYPE (HCC): ICD-10-CM

## 2023-07-11 PROCEDURE — 99396 PREV VISIT EST AGE 40-64: CPT | Performed by: FAMILY MEDICINE

## 2023-07-11 PROCEDURE — 99213 OFFICE O/P EST LOW 20 MIN: CPT | Performed by: FAMILY MEDICINE
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
## 2023-07-18 ENCOUNTER — PATIENT MESSAGE (OUTPATIENT)
Dept: PULMONOLOGY | Facility: CLINIC | Age: 63
End: 2023-07-18

## 2023-07-18 DIAGNOSIS — J44.9 CHRONIC OBSTRUCTIVE PULMONARY DISEASE, UNSPECIFIED COPD TYPE (HCC): ICD-10-CM

## 2023-07-18 RX ORDER — FLUTICASONE FUROATE, UMECLIDINIUM BROMIDE AND VILANTEROL TRIFENATATE 100; 62.5; 25 UG/1; UG/1; UG/1
1 POWDER RESPIRATORY (INHALATION) DAILY
Qty: 60 BLISTER | Refills: 5 | Status: SHIPPED | OUTPATIENT
Start: 2023-07-18 | End: 2024-01-14

## 2023-07-25 ENCOUNTER — TELEPHONE (OUTPATIENT)
Dept: FAMILY MEDICINE CLINIC | Facility: CLINIC | Age: 63
End: 2023-07-25

## 2023-07-25 NOTE — TELEPHONE ENCOUNTER
Please schedule patient for a follow up ER, ok to use 12 pm on Wednesday or Friday if no TCM scheduled.

## 2023-07-25 NOTE — TELEPHONE ENCOUNTER
Ramiro Alan went to the ER at [facility] yesterday and was diagnosed with gallbladder stones and advised she needed surgery. She does not want to go through [facility]. She wants to have everything through [facility]. Do you need to see her or refer her somewhere?

## 2023-07-25 NOTE — TELEPHONE ENCOUNTER
Called patient. She works during those hours on Wednesday and Friday. She was able to schedule for 12 on Monday. I hope that's OK?

## 2023-07-31 ENCOUNTER — OFFICE VISIT (OUTPATIENT)
Dept: FAMILY MEDICINE CLINIC | Facility: CLINIC | Age: 63
End: 2023-07-31
Payer: COMMERCIAL

## 2023-07-31 VITALS
SYSTOLIC BLOOD PRESSURE: 122 MMHG | OXYGEN SATURATION: 99 % | RESPIRATION RATE: 16 BRPM | HEART RATE: 91 BPM | WEIGHT: 156.2 LBS | TEMPERATURE: 97.2 F | DIASTOLIC BLOOD PRESSURE: 80 MMHG | BODY MASS INDEX: 27.68 KG/M2 | HEIGHT: 63 IN

## 2023-07-31 DIAGNOSIS — K80.20 CALCULUS OF GALLBLADDER WITHOUT CHOLECYSTITIS WITHOUT OBSTRUCTION: Primary | ICD-10-CM

## 2023-07-31 DIAGNOSIS — J44.9 CHRONIC OBSTRUCTIVE PULMONARY DISEASE, UNSPECIFIED COPD TYPE (HCC): ICD-10-CM

## 2023-07-31 DIAGNOSIS — F41.9 ANXIETY DISORDER, UNSPECIFIED TYPE: ICD-10-CM

## 2023-07-31 PROCEDURE — 99214 OFFICE O/P EST MOD 30 MIN: CPT | Performed by: FAMILY MEDICINE

## 2023-07-31 PROCEDURE — 3725F SCREEN DEPRESSION PERFORMED: CPT | Performed by: FAMILY MEDICINE
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
## 2023-08-16 ENCOUNTER — CONSULT (OUTPATIENT)
Dept: SURGERY | Facility: CLINIC | Age: 63
End: 2023-08-16
Payer: COMMERCIAL

## 2023-08-16 VITALS
TEMPERATURE: 97.6 F | SYSTOLIC BLOOD PRESSURE: 137 MMHG | HEIGHT: 64 IN | DIASTOLIC BLOOD PRESSURE: 87 MMHG | HEART RATE: 87 BPM | WEIGHT: 157.13 LBS | BODY MASS INDEX: 26.82 KG/M2

## 2023-08-16 DIAGNOSIS — K80.20 CALCULUS OF GALLBLADDER WITHOUT CHOLECYSTITIS WITHOUT OBSTRUCTION: ICD-10-CM

## 2023-08-16 PROCEDURE — 99244 OFF/OP CNSLTJ NEW/EST MOD 40: CPT | Performed by: SURGERY

## 2023-08-16 RX ORDER — HEPARIN SODIUM 5000 [USP'U]/ML
5000 INJECTION, SOLUTION INTRAVENOUS; SUBCUTANEOUS ONCE
OUTPATIENT
Start: 2023-08-16 | End: 2023-08-16
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
## 2023-08-16 NOTE — ASSESSMENT & PLAN NOTE
Her signs and symptoms all point to symptomatic gallbladder disease. It seems reasonable to proceed with cholecystectomy. I discussed the laparoscopic or robotic cholecystectomy with her. I discussed the procedure in detail including risks, benefits, alternatives and what to expect postop. She understands and is agreeable to go ahead.     Plan: Robot or laparoscopic cholecystectomy
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
## 2023-09-11 NOTE — PRE-PROCEDURE INSTRUCTIONS
No outpatient medications have been marked as taking for the 9/14/23 encounter [facility] Encounter). Medication instructions for day surgery reviewed. Please use only a sip of water to take your instructed medications. Avoid all over the counter vitamins, supplements and NSAIDS starting today. Tylenol is ok to take as needed. You will receive a call one business day prior to surgery with an arrival time and hospital directions. If your surgery is scheduled on a Monday, the hospital will be calling you on the Friday prior to your surgery. If you have not heard from anyone by 8pm, please call the hospital supervisor through the hospital  at [phone]. Jt Champion [phone]). Do not eat or drink anything after midnight the night before your surgery, including candy, mints, lifesavers, or chewing gum. Do not drink alcohol 24hrs before your surgery. Try not to smoke at least 24hrs before your surgery. Follow the pre surgery showering instructions as listed in the [facility] Surgical Experience Booklet” or otherwise provided by your surgeon's office. Do not shave the surgical area 24 hours before surgery. Do not apply any lotions, creams, including makeup, cologne, deodorant, or perfumes after showering on the day of your surgery. No contact lenses, eye make-up, or artificial eyelashes. Remove nail polish, including gel polish, and any artificial, gel, or acrylic nails if possible. Remove all jewelry including rings and body piercing jewelry. Wear causal clothing that is easy to take on and off. Consider your type of surgery. Keep any valuables, jewelry, piercings at home. Please bring any specially ordered equipment (sling, braces) if indicated. Arrange for a responsible person to drive you to and from the hospital on the day of your surgery. Visitor Guidelines discussed.      Call the surgeon's office with any new illnesses, exposures, or additional questions prior to surgery. Please reference your [facility] Surgical Experience Booklet” for additional information to prepare for your upcoming surgery.

## 2023-09-13 ENCOUNTER — ANESTHESIA EVENT (OUTPATIENT)
Dept: PERIOP | Facility: HOSPITAL | Age: 63
End: 2023-09-13
Payer: COMMERCIAL

## 2023-09-13 NOTE — ANESTHESIA PREPROCEDURE EVALUATION
Procedure:  CHOLECYSTECTOMY LAPAROSCOPIC W/ ROBOTICS (Abdomen)    Relevant Problems   CARDIO   (+) Hyperlipidemia      GI/HEPATIC   (+) GERD (gastroesophageal reflux disease)      NEURO/PSYCH   (+) Anxiety      PULMONARY   (+) COPD (chronic obstructive pulmonary disease) (HCC)      7/24/23 EKG-SR, NSST abn  Physical Exam    Airway    Mallampati score: II  TM Distance: >3 FB  Neck ROM: full     Dental   No notable dental hx     Cardiovascular  Cardiovascular exam normal    Pulmonary  Pulmonary exam normal     Other Findings        Anesthesia Plan  ASA Score- 2     Anesthesia Type- general with ASA Monitors. Additional Monitors:   Airway Plan: ETT. Plan Factors-Exercise tolerance (METS): >4 METS. Chart reviewed. EKG reviewed. Imaging results reviewed. Existing labs reviewed. Patient summary reviewed. Patient is a current smoker. Patient instructed to abstain from smoking on day of procedure. Patient did not smoke on day of surgery. Induction-     Postoperative Plan-     Informed Consent- Anesthetic plan and risks discussed with patient. I personally reviewed this patient with the CRNA. Discussed and agreed on the Anesthesia Plan with the CRNA. Jairo Torres

## 2023-09-14 ENCOUNTER — ANESTHESIA (OUTPATIENT)
Dept: PERIOP | Facility: HOSPITAL | Age: 63
End: 2023-09-14
Payer: COMMERCIAL

## 2023-09-14 ENCOUNTER — HOSPITAL ENCOUNTER (OUTPATIENT)
Facility: HOSPITAL | Age: 63
Setting detail: OUTPATIENT SURGERY
Discharge: HOME | End: 2023-09-14
Attending: SURGERY | Admitting: SURGERY
Payer: COMMERCIAL

## 2023-09-14 VITALS
HEIGHT: 64 IN | HEART RATE: 90 BPM | TEMPERATURE: 96.5 F | SYSTOLIC BLOOD PRESSURE: 136 MMHG | RESPIRATION RATE: 20 BRPM | WEIGHT: 154 LBS | DIASTOLIC BLOOD PRESSURE: 79 MMHG | BODY MASS INDEX: 26.29 KG/M2 | OXYGEN SATURATION: 96 %

## 2023-09-14 DIAGNOSIS — K81.9 CHOLECYSTITIS: ICD-10-CM

## 2023-09-14 DIAGNOSIS — K80.20 CALCULUS OF GALLBLADDER WITHOUT CHOLECYSTITIS WITHOUT OBSTRUCTION: Primary | ICD-10-CM

## 2023-09-14 PROCEDURE — 88360 TUMOR IMMUNOHISTOCHEM/MANUAL: CPT | Performed by: PATHOLOGY

## 2023-09-14 PROCEDURE — S2900 ROBOTIC SURGICAL SYSTEM: HCPCS | Performed by: PHYSICIAN ASSISTANT

## 2023-09-14 PROCEDURE — S2900 ROBOTIC SURGICAL SYSTEM: HCPCS | Performed by: SURGERY

## 2023-09-14 PROCEDURE — 88342 IMHCHEM/IMCYTCHM 1ST ANTB: CPT | Performed by: PATHOLOGY

## 2023-09-14 PROCEDURE — 47562 LAPAROSCOPIC CHOLECYSTECTOMY: CPT | Performed by: PHYSICIAN ASSISTANT

## 2023-09-14 PROCEDURE — 88304 TISSUE EXAM BY PATHOLOGIST: CPT | Performed by: PATHOLOGY

## 2023-09-14 PROCEDURE — 47562 LAPAROSCOPIC CHOLECYSTECTOMY: CPT | Performed by: SURGERY

## 2023-09-14 PROCEDURE — 88341 IMHCHEM/IMCYTCHM EA ADD ANTB: CPT | Performed by: PATHOLOGY

## 2023-09-14 RX ORDER — ONDANSETRON 2 MG/ML
4 INJECTION INTRAMUSCULAR; INTRAVENOUS ONCE AS NEEDED
Status: DISCONTINUED | OUTPATIENT
Start: 2023-09-14 | End: 2023-09-14 | Stop reason: HOSPADM

## 2023-09-14 RX ORDER — MIDAZOLAM HYDROCHLORIDE 2 MG/2ML
INJECTION, SOLUTION INTRAMUSCULAR; INTRAVENOUS AS NEEDED
Status: DISCONTINUED | OUTPATIENT
Start: 2023-09-14 | End: 2023-09-14

## 2023-09-14 RX ORDER — ACETAMINOPHEN 325 MG/1
975 TABLET ORAL ONCE
Status: COMPLETED | OUTPATIENT
Start: 2023-09-14 | End: 2023-09-14

## 2023-09-14 RX ORDER — SODIUM CHLORIDE, SODIUM LACTATE, POTASSIUM CHLORIDE, CALCIUM CHLORIDE 600; 310; 30; 20 MG/100ML; MG/100ML; MG/100ML; MG/100ML
INJECTION, SOLUTION INTRAVENOUS CONTINUOUS PRN
Status: DISCONTINUED | OUTPATIENT
Start: 2023-09-14 | End: 2023-09-14

## 2023-09-14 RX ORDER — ONDANSETRON 2 MG/ML
INJECTION INTRAMUSCULAR; INTRAVENOUS AS NEEDED
Status: DISCONTINUED | OUTPATIENT
Start: 2023-09-14 | End: 2023-09-14

## 2023-09-14 RX ORDER — CEFAZOLIN SODIUM 1 G/50ML
1000 SOLUTION INTRAVENOUS
Status: COMPLETED | OUTPATIENT
Start: 2023-09-14 | End: 2023-09-14

## 2023-09-14 RX ORDER — ALBUTEROL SULFATE 2.5 MG/3ML
2.5 SOLUTION RESPIRATORY (INHALATION) ONCE
Status: COMPLETED | OUTPATIENT
Start: 2023-09-14 | End: 2023-09-14

## 2023-09-14 RX ORDER — PROPOFOL 10 MG/ML
INJECTION, EMULSION INTRAVENOUS AS NEEDED
Status: DISCONTINUED | OUTPATIENT
Start: 2023-09-14 | End: 2023-09-14

## 2023-09-14 RX ORDER — HYDROMORPHONE HCL/PF 1 MG/ML
0.2 SYRINGE (ML) INJECTION EVERY 4 HOURS PRN
Status: DISCONTINUED | OUTPATIENT
Start: 2023-09-14 | End: 2023-09-14 | Stop reason: HOSPADM

## 2023-09-14 RX ORDER — DEXAMETHASONE SODIUM PHOSPHATE 10 MG/ML
INJECTION, SOLUTION INTRAMUSCULAR; INTRAVENOUS AS NEEDED
Status: DISCONTINUED | OUTPATIENT
Start: 2023-09-14 | End: 2023-09-14

## 2023-09-14 RX ORDER — MAGNESIUM HYDROXIDE 1200 MG/15ML
LIQUID ORAL AS NEEDED
Status: DISCONTINUED | OUTPATIENT
Start: 2023-09-14 | End: 2023-09-14 | Stop reason: HOSPADM

## 2023-09-14 RX ORDER — HEPARIN SODIUM 5000 [USP'U]/ML
5000 INJECTION, SOLUTION INTRAVENOUS; SUBCUTANEOUS ONCE
Status: COMPLETED | OUTPATIENT
Start: 2023-09-14 | End: 2023-09-14

## 2023-09-14 RX ORDER — ROCURONIUM BROMIDE 10 MG/ML
INJECTION, SOLUTION INTRAVENOUS AS NEEDED
Status: DISCONTINUED | OUTPATIENT
Start: 2023-09-14 | End: 2023-09-14

## 2023-09-14 RX ORDER — BUPIVACAINE HYDROCHLORIDE 5 MG/ML
INJECTION, SOLUTION EPIDURAL; INTRACAUDAL AS NEEDED
Status: DISCONTINUED | OUTPATIENT
Start: 2023-09-14 | End: 2023-09-14 | Stop reason: HOSPADM

## 2023-09-14 RX ORDER — HYDROCODONE BITARTRATE AND ACETAMINOPHEN 5; 325 MG/1; MG/1
1 TABLET ORAL EVERY 4 HOURS PRN
Status: DISCONTINUED | OUTPATIENT
Start: 2023-09-14 | End: 2023-09-14 | Stop reason: HOSPADM

## 2023-09-14 RX ORDER — LIDOCAINE HYDROCHLORIDE 10 MG/ML
INJECTION, SOLUTION EPIDURAL; INFILTRATION; INTRACAUDAL; PERINEURAL AS NEEDED
Status: DISCONTINUED | OUTPATIENT
Start: 2023-09-14 | End: 2023-09-14

## 2023-09-14 RX ORDER — OXYCODONE HYDROCHLORIDE 5 MG/1
5 TABLET ORAL EVERY 4 HOURS PRN
Qty: 10 TABLET | Refills: 0 | Status: SHIPPED | OUTPATIENT
Start: 2023-09-14

## 2023-09-14 RX ORDER — FENTANYL CITRATE 50 UG/ML
INJECTION, SOLUTION INTRAMUSCULAR; INTRAVENOUS AS NEEDED
Status: DISCONTINUED | OUTPATIENT
Start: 2023-09-14 | End: 2023-09-14

## 2023-09-14 RX ORDER — HYDROMORPHONE HCL/PF 1 MG/ML
0.5 SYRINGE (ML) INJECTION
Status: DISCONTINUED | OUTPATIENT
Start: 2023-09-14 | End: 2023-09-14 | Stop reason: HOSPADM

## 2023-09-14 RX ORDER — FENTANYL CITRATE/PF 50 MCG/ML
50 SYRINGE (ML) INJECTION
Status: DISCONTINUED | OUTPATIENT
Start: 2023-09-14 | End: 2023-09-14 | Stop reason: HOSPADM

## 2023-09-14 RX ORDER — DIPHENHYDRAMINE HYDROCHLORIDE 50 MG/ML
12.5 INJECTION INTRAMUSCULAR; INTRAVENOUS ONCE AS NEEDED
Status: DISCONTINUED | OUTPATIENT
Start: 2023-09-14 | End: 2023-09-14 | Stop reason: HOSPADM

## 2023-09-14 RX ORDER — SODIUM CHLORIDE, SODIUM LACTATE, POTASSIUM CHLORIDE, CALCIUM CHLORIDE 600; 310; 30; 20 MG/100ML; MG/100ML; MG/100ML; MG/100ML
75 INJECTION, SOLUTION INTRAVENOUS CONTINUOUS
Status: DISCONTINUED | OUTPATIENT
Start: 2023-09-14 | End: 2023-09-14 | Stop reason: HOSPADM

## 2023-09-14 RX ORDER — INDOCYANINE GREEN AND WATER 25 MG
KIT INJECTION AS NEEDED
Status: DISCONTINUED | OUTPATIENT
Start: 2023-09-14 | End: 2023-09-14

## 2023-09-14 RX ADMIN — FENTANYL CITRATE 50 MCG: 50 INJECTION INTRAMUSCULAR; INTRAVENOUS at 07:55

## 2023-09-14 RX ADMIN — PROPOFOL 50 MG: 10 INJECTION, EMULSION INTRAVENOUS at 08:36

## 2023-09-14 RX ADMIN — SUGAMMADEX 100 MG: 100 INJECTION, SOLUTION INTRAVENOUS at 08:39

## 2023-09-14 RX ADMIN — ONDANSETRON 4 MG: 2 INJECTION INTRAMUSCULAR; INTRAVENOUS at 08:26

## 2023-09-14 RX ADMIN — HYDROMORPHONE HYDROCHLORIDE 0.5 MG: 1 INJECTION, SOLUTION INTRAMUSCULAR; INTRAVENOUS; SUBCUTANEOUS at 09:27

## 2023-09-14 RX ADMIN — ALBUTEROL SULFATE 2.5 MG: 2.5 SOLUTION RESPIRATORY (INHALATION) at 07:03

## 2023-09-14 RX ADMIN — LIDOCAINE HYDROCHLORIDE 50 MG: 10 INJECTION, SOLUTION EPIDURAL; INFILTRATION; INTRACAUDAL; PERINEURAL at 07:27

## 2023-09-14 RX ADMIN — MIDAZOLAM 2 MG: 1 INJECTION INTRAMUSCULAR; INTRAVENOUS at 07:20

## 2023-09-14 RX ADMIN — FENTANYL CITRATE 50 MCG: 50 INJECTION INTRAMUSCULAR; INTRAVENOUS at 09:17

## 2023-09-14 RX ADMIN — SODIUM CHLORIDE, SODIUM LACTATE, POTASSIUM CHLORIDE, AND CALCIUM CHLORIDE: .6; .31; .03; .02 INJECTION, SOLUTION INTRAVENOUS at 06:57

## 2023-09-14 RX ADMIN — ACETAMINOPHEN 975 MG: 325 TABLET ORAL at 06:07

## 2023-09-14 RX ADMIN — SUGAMMADEX 100 MG: 100 INJECTION, SOLUTION INTRAVENOUS at 08:49

## 2023-09-14 RX ADMIN — PROPOFOL 30 MCG/KG/MIN: 10 INJECTION, EMULSION INTRAVENOUS at 07:45

## 2023-09-14 RX ADMIN — FENTANYL CITRATE 50 MCG: 50 INJECTION INTRAMUSCULAR; INTRAVENOUS at 08:35

## 2023-09-14 RX ADMIN — HEPARIN SODIUM 5000 UNITS: 5000 INJECTION INTRAVENOUS; SUBCUTANEOUS at 07:03

## 2023-09-14 RX ADMIN — FENTANYL CITRATE 50 MCG: 50 INJECTION INTRAMUSCULAR; INTRAVENOUS at 07:27

## 2023-09-14 RX ADMIN — INDOCYANINE GREEN AND WATER 3.75 MG: KIT at 07:15

## 2023-09-14 RX ADMIN — FENTANYL CITRATE 50 MCG: 50 INJECTION INTRAMUSCULAR; INTRAVENOUS at 07:45

## 2023-09-14 RX ADMIN — DEXAMETHASONE SODIUM PHOSPHATE 10 MG: 10 INJECTION, SOLUTION INTRAMUSCULAR; INTRAVENOUS at 07:42

## 2023-09-14 RX ADMIN — PROPOFOL 150 MG: 10 INJECTION, EMULSION INTRAVENOUS at 07:28

## 2023-09-14 RX ADMIN — ROCURONIUM BROMIDE 50 MG: 10 INJECTION, SOLUTION INTRAVENOUS at 07:28

## 2023-09-14 RX ADMIN — ROCURONIUM BROMIDE 10 MG: 10 INJECTION, SOLUTION INTRAVENOUS at 07:58

## 2023-09-14 RX ADMIN — FENTANYL CITRATE 50 MCG: 50 INJECTION INTRAMUSCULAR; INTRAVENOUS at 09:06

## 2023-09-14 RX ADMIN — SUGAMMADEX 200 MG: 100 INJECTION, SOLUTION INTRAVENOUS at 08:33

## 2023-09-14 RX ADMIN — CEFAZOLIN SODIUM 1000 MG: 1 SOLUTION INTRAVENOUS at 07:38

## 2023-09-14 NOTE — ANESTHESIA POSTPROCEDURE EVALUATION
Post-Op Assessment Note    CV Status:  Stable    Pain management: adequate     Mental Status:  Awake and sleepy   Hydration Status:  Euvolemic   PONV Controlled:  Controlled   Airway Patency:  Patent  Airway: intubated      Post Op Vitals Reviewed: Yes      Staff: CRNA         No notable events documented.     BP   152/72   Temp 97   Pulse 97   Resp 15   SpO2 100

## 2023-09-14 NOTE — INTERVAL H&P NOTE
H&P reviewed. After examining the patient I find no changes in the patients condition since the H&P had been written.   Plan: robotic cholecystectomy

## 2023-09-14 NOTE — LETTER
[address] [address]  Dept: [phone]    September 14, 2023     Patient: Jonah Soriano   YOB: 1960   Date of Visit: 9/14/2023       To Whom it May Concern:    Jonah Soriano is under my professional care. She was seen in the hospital from 9/14/2023 to 09/14/23. She may return to work on 9/18 without limitations. If you have any questions or concerns, please don't hesitate to call.          Sincerely,          Filemon Palomino MD

## 2023-09-14 NOTE — DISCHARGE INSTR - AVS FIRST PAGE
After Surgery Instructions    ? ? ?When you return home, you may eat whatever you feel comfortable eating. It is common to not have much of an appetite. Do not force yourself to eat. Your appetite will usually return in 1 or 2 weeks. Some foods may still not agree with you, but this will usually pass in 4 to 6 weeks. ? ? ? Resume all of your regular medications, including blood thinners and aspirin, after going home. ? ? ? The day after surgery you may remove the dressings. Leave any skin tapes on the incisions in place. A dressing is then optional.  ? ??You may shower the day after surgery. Plain soap and water is fine. Special soaps, ointments, alcohol or peroxide is not necessary. No swimming in pools for 5 days, and do not go in the ocean until seen in the office. ? ??You may become constipated, especially if taking pain medications, for the first 3 or 4 days. You may take any over the counter laxative. [address] is good to start. ? ??You will be given a prescription for pain medication. Take it as needed only. You may also take any over the counter medication for more mild pain. ? ??Immediately after surgery, you may ride in a car, climb steps and walk as tolerated. When you are pain free, it is Ok to drive. Be aware that you will tire out very easily for the first few days. ? ? ? Do not lift anything over 15 pounds for one week. Otherwise, there are not specific limitations to your activity and you may resume normal activity as tolerated. After surgery you may return back to work on Monday      Contact [facility] at [phone] if any of the following occur:    ? ? ?A fever over 101° that does not respond to Tylenol. A low grade fever is not unusual after surgery and is not necessarily a sign of infection. ? ??Increasing abdominal pain.  Some pain after surgery is normal. Pain that was improving but has now gotten increasingly worse may not be normal and should be reported  ? ? ?Persistent nausea and vomiting. IF YOU HAVE ANY QUESTIONS OR CONCERNS PLEASE FEEL FREE TO CONTACT US AT ANY TIME.

## 2023-09-14 NOTE — OP NOTE
OPERATIVE REPORT  PATIENT NAME: Jonah Soriano    :  1960  MRN: [id number]  Pt Location: [facility] OR ROOM 14    SURGERY DATE: 2023    Surgeon(s) and Role:     * Filemon Palomino MD - Primary     * Angelica Johnson PA-C - Assisting    Preop Diagnosis:  Cholecystitis [K81.9]    Post-Op Diagnosis Codes:     * Cholecystitis [K81.9]    Procedure(s):  CHOLECYSTECTOMY LAPAROSCOPIC W/ ROBOTICS    Specimen(s):  ID Type Source Tests Collected by Time Destination   1 : gallbladder Tissue Gallbladder TISSUE EXAM Filemon Palomino MD 2023 [id number]        Estimated Blood Loss:   Minimal    Drains:  * No LDAs found *    Anesthesia Type:   General    Operative Indications:  Cholecystitis [K81.9]      Operative Findings:  Chronically inflamed gallbladder with omental adhesions to the gallbladder and the duodenum was stuck to the lower portion of the gallbladder    Complications:   None    Procedure and Technique:  The patient was identified by visualization conversation on the operating room table. After satisfactory induction general anesthesia, the patient's abdomen was prepped and draped in usual sterile fashion. Timeout taken. Local anesthesia of 0.5% Marcaine was infiltrated into the skin and subcutaneous tissue above the umbilicus. An incision was made. The fascia was grasped with Kocher clamps and a varies needle introduced. Did not feel like it was in the proper location. The incision in the fascia was extended and a 8 mm robot was attempted to be placed but did not go away in satisfactorily. In the left upper quadrant, local anesthesia infiltrated stab wound made and a varies needle introduced into the abdominal cavity. Carbon oxide was insufflated to about 3 L on the left abdomen, local anesthesia infiltrated incision made and an 8 mm trocar introduced easily. Laparoscope was placed there were number of adhesions to the midline. At the site of the varies needle, a 5 mm trocar introduced.   A scissors was then placed through this trocar and took down the flimsy midline adhesions. This allowed visualization of the gallbladder and the liver and the right side of the abdomen. On the right side of the abdomen, local anesthesia infiltrated incision made and an 8 mm robotic cannula introduced. Using the previously placed left sided trocar for the scope and left upper quadrant trocar for scissors more of the midline and adhesions were taken down. There were some adhesions around the left sided trocar which were all sided also taken down. There was a free space supraumbilically and a 12 mm trocar was then placed through this. Attention was then turned to the gallbladder. Some adhesions to the falciform ligament were sharply taken down with the hook cautery. The top the gallbladder was grasped and lifted up. Omental adhesions were sharply and bluntly taken off the gallbladder. The duodenum was noted to be plastered to the lower portion of the gallbladder and it was gently teased off. Dissection was then carried down further at the end of the gallbladder. Using firefly, the common duct was easily visualized as well as the cystic duct. The cystic duct was skeletonized as was the cystic artery. The cystic cystic duct was doubly clipped proximally singly distally the artery was clipped clamped proximally. The cystic duct was cut with the scissors. Cautery was used to transect the cystic artery. With the gallbladder lifted up, the hook cautery was used to cauterize the gallbladder free from the liver bed. There is no bleeding or bile leak seen. The left-sided instrument was removed and the scope placed there. A bag was placed through the 12 mm trocar site and the gallbladder was deposited in the bag. The robot was then disconnected the trocars were removed. Gallbladder pulled free from the supraumbilical incision. The fascia of this incision was closed with interrupted 0 Vicryl sutures.   Skin of all incisions closed with subcuticular 4-0 Monocryl. Glue dressing applied. Instrument needle sponge counts were correct. RF wanding was clear. Patient was taken recovery in satisfactory condition   I was present for the entire procedure., A qualified resident physician was not available. and A physician assistant was required during the procedure for retraction, tissue handling, dissection and suturing.     Patient Disposition:  PACU         SIGNATURE: Alannah García MD  DATE: September 14, 2023  TIME: 9:10 AM

## 2023-09-19 PROCEDURE — 88304 TISSUE EXAM BY PATHOLOGIST: CPT | Performed by: PATHOLOGY

## 2023-09-19 PROCEDURE — 88360 TUMOR IMMUNOHISTOCHEM/MANUAL: CPT | Performed by: PATHOLOGY

## 2023-09-19 PROCEDURE — 88341 IMHCHEM/IMCYTCHM EA ADD ANTB: CPT | Performed by: PATHOLOGY

## 2023-09-19 PROCEDURE — 88342 IMHCHEM/IMCYTCHM 1ST ANTB: CPT | Performed by: PATHOLOGY

## 2024-01-07 DIAGNOSIS — F41.9 ANXIETY DISORDER, UNSPECIFIED TYPE: ICD-10-CM

## 2024-01-07 DIAGNOSIS — J44.9 CHRONIC OBSTRUCTIVE PULMONARY DISEASE, UNSPECIFIED COPD TYPE (HCC): ICD-10-CM

## 2024-01-08 RX ORDER — FLUTICASONE FUROATE, UMECLIDINIUM BROMIDE AND VILANTEROL TRIFENATATE 100; 62.5; 25 UG/1; UG/1; UG/1
1 POWDER RESPIRATORY (INHALATION) DAILY
Qty: 60 BLISTER | Refills: 0 | Status: SHIPPED | OUTPATIENT
Start: 2024-01-08 | End: 2024-07-06

## 2024-01-08 RX ORDER — ALBUTEROL SULFATE 2.5 MG/3ML
2.5 SOLUTION RESPIRATORY (INHALATION) EVERY 4 HOURS PRN
Qty: 180 ML | Refills: 0 | Status: SHIPPED | OUTPATIENT
Start: 2024-01-08

## 2024-01-25 DIAGNOSIS — F41.9 ANXIETY DISORDER, UNSPECIFIED TYPE: ICD-10-CM

## 2024-02-19 DIAGNOSIS — Z00.6 ENCOUNTER FOR EXAMINATION FOR NORMAL COMPARISON OR CONTROL IN CLINICAL RESEARCH PROGRAM: ICD-10-CM

## 2024-05-13 ENCOUNTER — TELEPHONE (OUTPATIENT)
Dept: FAMILY MEDICINE CLINIC | Facility: CLINIC | Age: 64
End: 2024-05-13

## 2024-05-13 NOTE — TELEPHONE ENCOUNTER
Patient scheduled an appt with you on 5/17 dizzy and memory loss for 15 minutes.  Is this ok to keep?

## 2024-05-17 ENCOUNTER — HOSPITAL ENCOUNTER (OUTPATIENT)
Dept: MAMMOGRAPHY | Facility: CLINIC | Age: 64
Discharge: HOME | End: 2024-05-17
Payer: COMMERCIAL

## 2024-05-17 ENCOUNTER — OFFICE VISIT (OUTPATIENT)
Dept: FAMILY MEDICINE CLINIC | Facility: CLINIC | Age: 64
End: 2024-05-17
Payer: COMMERCIAL

## 2024-05-17 ENCOUNTER — APPOINTMENT (OUTPATIENT)
Dept: LAB | Facility: CLINIC | Age: 64
End: 2024-05-17
Payer: COMMERCIAL

## 2024-05-17 VITALS
SYSTOLIC BLOOD PRESSURE: 126 MMHG | HEIGHT: 64 IN | BODY MASS INDEX: 28.41 KG/M2 | OXYGEN SATURATION: 96 % | RESPIRATION RATE: 16 BRPM | WEIGHT: 166.4 LBS | HEART RATE: 86 BPM | DIASTOLIC BLOOD PRESSURE: 78 MMHG | TEMPERATURE: 97.8 F

## 2024-05-17 VITALS — BODY MASS INDEX: 28.34 KG/M2 | WEIGHT: 166 LBS | HEIGHT: 64 IN

## 2024-05-17 DIAGNOSIS — Z13.0 SCREENING FOR BLOOD DISEASE: ICD-10-CM

## 2024-05-17 DIAGNOSIS — R42 DIZZINESS: ICD-10-CM

## 2024-05-17 DIAGNOSIS — R92.30 DENSE BREAST TISSUE ON MAMMOGRAM: ICD-10-CM

## 2024-05-17 DIAGNOSIS — R26.89 BALANCE PROBLEMS: ICD-10-CM

## 2024-05-17 DIAGNOSIS — Z12.31 ENCOUNTER FOR SCREENING MAMMOGRAM FOR MALIGNANT NEOPLASM OF BREAST: ICD-10-CM

## 2024-05-17 DIAGNOSIS — Z13.228 SCREENING FOR METABOLIC DISORDER: ICD-10-CM

## 2024-05-17 DIAGNOSIS — Z13.89 SCREENING FOR BLOOD OR PROTEIN IN URINE: ICD-10-CM

## 2024-05-17 DIAGNOSIS — Z13.29 SCREENING FOR THYROID DISORDER: ICD-10-CM

## 2024-05-17 DIAGNOSIS — J44.9 CHRONIC OBSTRUCTIVE PULMONARY DISEASE, UNSPECIFIED COPD TYPE (HCC): ICD-10-CM

## 2024-05-17 DIAGNOSIS — G47.00 INSOMNIA, UNSPECIFIED TYPE: ICD-10-CM

## 2024-05-17 DIAGNOSIS — E55.9 VITAMIN D DEFICIENCY: ICD-10-CM

## 2024-05-17 DIAGNOSIS — Z13.220 SCREENING FOR CHOLESTEROL LEVEL: ICD-10-CM

## 2024-05-17 DIAGNOSIS — R41.3 MEMORY LOSS: ICD-10-CM

## 2024-05-17 DIAGNOSIS — Z13.1 SCREENING FOR DIABETES MELLITUS: ICD-10-CM

## 2024-05-17 DIAGNOSIS — Z00.6 ENCOUNTER FOR EXAMINATION FOR NORMAL COMPARISON OR CONTROL IN CLINICAL RESEARCH PROGRAM: ICD-10-CM

## 2024-05-17 DIAGNOSIS — R41.3 MEMORY LOSS: Primary | ICD-10-CM

## 2024-05-17 LAB
25(OH)D3 SERPL-MCNC: 12.1 NG/ML (ref 30–100)
BACTERIA UR QL AUTO: NORMAL /HPF
BACTERIA UR QL AUTO: NORMAL /HPF
BASOPHILS # BLD AUTO: 0.08 THOUSANDS/ÂΜL (ref 0–0.1)
BASOPHILS NFR BLD AUTO: 1 % (ref 0–1)
BILIRUB UR QL STRIP: NEGATIVE
BILIRUB UR QL STRIP: NEGATIVE
CHOLEST SERPL-MCNC: 248 MG/DL
CLARITY UR: CLEAR
CLARITY UR: CLEAR
COLOR UR: ABNORMAL
COLOR UR: ABNORMAL
EOSINOPHIL # BLD AUTO: 0.48 THOUSAND/ÂΜL (ref 0–0.61)
EOSINOPHIL NFR BLD AUTO: 6 % (ref 0–6)
ERYTHROCYTE [DISTWIDTH] IN BLOOD BY AUTOMATED COUNT: 13.6 % (ref 11.6–15.1)
GLUCOSE UR STRIP-MCNC: NEGATIVE MG/DL
GLUCOSE UR STRIP-MCNC: NEGATIVE MG/DL
HCT VFR BLD AUTO: 47.2 % (ref 34.8–46.1)
HDLC SERPL-MCNC: 64 MG/DL
HGB BLD-MCNC: 15 G/DL (ref 11.5–15.4)
HGB UR QL STRIP.AUTO: NEGATIVE
HGB UR QL STRIP.AUTO: NEGATIVE
IMM GRANULOCYTES # BLD AUTO: 0.02 THOUSAND/UL (ref 0–0.2)
IMM GRANULOCYTES NFR BLD AUTO: 0 % (ref 0–2)
KETONES UR STRIP-MCNC: NEGATIVE MG/DL
KETONES UR STRIP-MCNC: NEGATIVE MG/DL
LDLC SERPL CALC-MCNC: 153 MG/DL (ref 0–100)
LEUKOCYTE ESTERASE UR QL STRIP: NEGATIVE
LEUKOCYTE ESTERASE UR QL STRIP: NEGATIVE
LYMPHOCYTES # BLD AUTO: 2.72 THOUSANDS/ÂΜL (ref 0.6–4.47)
LYMPHOCYTES NFR BLD AUTO: 33 % (ref 14–44)
MCH RBC QN AUTO: 30.7 PG (ref 26.8–34.3)
MCHC RBC AUTO-ENTMCNC: 31.8 G/DL (ref 31.4–37.4)
MCV RBC AUTO: 97 FL (ref 82–98)
MONOCYTES # BLD AUTO: 0.61 THOUSAND/ÂΜL (ref 0.17–1.22)
MONOCYTES NFR BLD AUTO: 7 % (ref 4–12)
NEUTROPHILS # BLD AUTO: 4.28 THOUSANDS/ÂΜL (ref 1.85–7.62)
NEUTS SEG NFR BLD AUTO: 53 % (ref 43–75)
NITRITE UR QL STRIP: NEGATIVE
NITRITE UR QL STRIP: NEGATIVE
NON-SQ EPI CELLS URNS QL MICRO: NORMAL /HPF
NON-SQ EPI CELLS URNS QL MICRO: NORMAL /HPF
NRBC BLD AUTO-RTO: 0 /100 WBCS
PH UR STRIP.AUTO: 6 [PH]
PH UR STRIP.AUTO: 6 [PH]
PLATELET # BLD AUTO: 322 THOUSANDS/UL (ref 149–390)
PMV BLD AUTO: 10 FL (ref 8.9–12.7)
PROT UR STRIP-MCNC: ABNORMAL MG/DL
PROT UR STRIP-MCNC: ABNORMAL MG/DL
RBC # BLD AUTO: 4.89 MILLION/UL (ref 3.81–5.12)
RBC #/AREA URNS AUTO: NORMAL /HPF
RBC #/AREA URNS AUTO: NORMAL /HPF
SP GR UR STRIP.AUTO: 1.02 (ref 1–1.03)
SP GR UR STRIP.AUTO: 1.02 (ref 1–1.03)
TRIGL SERPL-MCNC: 155 MG/DL
TSH SERPL DL<=0.05 MIU/L-ACNC: 1.91 UIU/ML (ref 0.45–4.5)
UROBILINOGEN UR STRIP-ACNC: <2 MG/DL
UROBILINOGEN UR STRIP-ACNC: <2 MG/DL
VIT B12 SERPL-MCNC: 141 PG/ML (ref 180–914)
WBC # BLD AUTO: 8.19 THOUSAND/UL (ref 4.31–10.16)
WBC #/AREA URNS AUTO: NORMAL /HPF
WBC #/AREA URNS AUTO: NORMAL /HPF

## 2024-05-17 PROCEDURE — 82607 VITAMIN B-12: CPT

## 2024-05-17 PROCEDURE — 81001 URINALYSIS AUTO W/SCOPE: CPT

## 2024-05-17 PROCEDURE — 77063 BREAST TOMOSYNTHESIS BI: CPT

## 2024-05-17 PROCEDURE — 85025 COMPLETE CBC W/AUTO DIFF WBC: CPT

## 2024-05-17 PROCEDURE — 77067 SCR MAMMO BI INCL CAD: CPT

## 2024-05-17 PROCEDURE — 84443 ASSAY THYROID STIM HORMONE: CPT

## 2024-05-17 PROCEDURE — 99214 OFFICE O/P EST MOD 30 MIN: CPT | Performed by: FAMILY MEDICINE

## 2024-05-17 PROCEDURE — 80061 LIPID PANEL: CPT

## 2024-05-17 PROCEDURE — 82306 VITAMIN D 25 HYDROXY: CPT

## 2024-05-17 PROCEDURE — 36415 COLL VENOUS BLD VENIPUNCTURE: CPT

## 2024-05-17 RX ORDER — TRAZODONE HYDROCHLORIDE 50 MG/1
50 TABLET ORAL
Qty: 30 TABLET | Refills: 1 | Status: SHIPPED | OUTPATIENT
Start: 2024-05-17
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
## 2024-05-23 ENCOUNTER — OFFICE VISIT (OUTPATIENT)
Dept: PLASTIC SURGERY | Facility: CLINIC | Age: 64
End: 2024-05-23
Payer: COMMERCIAL

## 2024-05-23 VITALS
DIASTOLIC BLOOD PRESSURE: 84 MMHG | BODY MASS INDEX: 28.34 KG/M2 | TEMPERATURE: 97.2 F | HEIGHT: 64 IN | WEIGHT: 166 LBS | SYSTOLIC BLOOD PRESSURE: 133 MMHG | HEART RATE: 85 BPM

## 2024-05-23 DIAGNOSIS — L98.9 SKIN LESION: Primary | ICD-10-CM

## 2024-05-23 PROCEDURE — 99203 OFFICE O/P NEW LOW 30 MIN: CPT | Performed by: PHYSICIAN ASSISTANT
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
## 2024-05-24 PROBLEM — L98.9 SKIN LESION: Status: ACTIVE | Noted: 2024-05-24

## 2024-06-13 LAB
APOB+LDLR+PCSK9 GENE MUT ANL BLD/T: NOT DETECTED
BRCA1+BRCA2 DEL+DUP + FULL MUT ANL BLD/T: NOT DETECTED
MLH1+MSH2+MSH6+PMS2 GN DEL+DUP+FUL M: NOT DETECTED

## 2024-09-11 DIAGNOSIS — G47.00 INSOMNIA, UNSPECIFIED TYPE: ICD-10-CM

## 2024-09-11 RX ORDER — TRAZODONE HYDROCHLORIDE 50 MG/1
50 TABLET, FILM COATED ORAL
Qty: 30 TABLET | Refills: 1 | Status: SHIPPED | OUTPATIENT
Start: 2024-09-11

## 2024-10-23 DIAGNOSIS — J44.9 CHRONIC OBSTRUCTIVE PULMONARY DISEASE, UNSPECIFIED COPD TYPE (HCC): ICD-10-CM

## 2024-10-23 RX ORDER — FLUTICASONE FUROATE, UMECLIDINIUM BROMIDE AND VILANTEROL TRIFENATATE 100; 62.5; 25 UG/1; UG/1; UG/1
1 POWDER RESPIRATORY (INHALATION) DAILY
Qty: 60 BLISTER | Refills: 0 | Status: SHIPPED | OUTPATIENT
Start: 2024-10-23 | End: 2025-04-21

## 2024-11-02 DIAGNOSIS — G47.00 INSOMNIA, UNSPECIFIED TYPE: ICD-10-CM

## 2024-11-04 RX ORDER — TRAZODONE HYDROCHLORIDE 50 MG/1
50 TABLET, FILM COATED ORAL
Qty: 30 TABLET | Refills: 1 | Status: SHIPPED | OUTPATIENT
Start: 2024-11-04

## 2024-11-04 NOTE — TELEPHONE ENCOUNTER
Spoke with patient, she will call back once she has her work schedule to figure out when she can come in for physical.

## 2024-11-04 NOTE — TELEPHONE ENCOUNTER
Please call patient, I approved her trazodone refill, she is due for a visit: recheck and physical, ok to schedule over new patient appt.

## 2024-11-07 DIAGNOSIS — J44.9 CHRONIC OBSTRUCTIVE PULMONARY DISEASE, UNSPECIFIED COPD TYPE (HCC): ICD-10-CM

## 2024-11-08 RX ORDER — FLUTICASONE FUROATE, UMECLIDINIUM BROMIDE AND VILANTEROL TRIFENATATE 100; 62.5; 25 UG/1; UG/1; UG/1
1 POWDER RESPIRATORY (INHALATION) DAILY
Qty: 60 BLISTER | Refills: 0 | Status: SHIPPED | OUTPATIENT
Start: 2024-11-08 | End: 2025-05-07

## 2024-11-26 DIAGNOSIS — G47.00 INSOMNIA, UNSPECIFIED TYPE: ICD-10-CM

## 2024-11-27 RX ORDER — TRAZODONE HYDROCHLORIDE 50 MG/1
100 TABLET, FILM COATED ORAL
Qty: 60 TABLET | Refills: 1 | Status: SHIPPED | OUTPATIENT
Start: 2024-11-27

## 2024-11-27 NOTE — TELEPHONE ENCOUNTER
Called and spoke to pt. Informed her you sent the medication to her pharmacy and scheduled pt for yearly visit 1/10/25 @ 11:45

## 2024-11-27 NOTE — TELEPHONE ENCOUNTER
Please call patient, I refilled her trazodone for 2 tablets at bedtime. She is due for a visit ( 30 min)

## 2024-12-16 DIAGNOSIS — G47.00 INSOMNIA, UNSPECIFIED TYPE: ICD-10-CM

## 2024-12-17 RX ORDER — TRAZODONE HYDROCHLORIDE 50 MG/1
100 TABLET, FILM COATED ORAL
Qty: 60 TABLET | Refills: 5 | Status: SHIPPED | OUTPATIENT
Start: 2024-12-17

## 2025-01-15 DIAGNOSIS — J44.9 CHRONIC OBSTRUCTIVE PULMONARY DISEASE, UNSPECIFIED COPD TYPE (HCC): ICD-10-CM

## 2025-01-16 NOTE — TELEPHONE ENCOUNTER
Requested medication(s) are due for refill today: No  Patient has already received a courtesy refill: No  Other reason request has been forwarded to provider: please assist patient yohana for 1/24

## 2025-01-17 RX ORDER — ALBUTEROL SULFATE 90 UG/1
2 INHALANT RESPIRATORY (INHALATION) EVERY 6 HOURS PRN
Qty: 18 G | Refills: 3 | Status: SHIPPED | OUTPATIENT
Start: 2025-01-17

## 2025-01-17 RX ORDER — FLUTICASONE FUROATE, UMECLIDINIUM BROMIDE AND VILANTEROL TRIFENATATE 100; 62.5; 25 UG/1; UG/1; UG/1
1 POWDER RESPIRATORY (INHALATION) DAILY
Qty: 180 BLISTER | Refills: 1 | Status: SHIPPED | OUTPATIENT
Start: 2025-01-17 | End: 2025-07-16

## 2025-01-27 DIAGNOSIS — G47.00 INSOMNIA, UNSPECIFIED TYPE: ICD-10-CM

## 2025-01-27 RX ORDER — TRAZODONE HYDROCHLORIDE 50 MG/1
100 TABLET, FILM COATED ORAL
Qty: 60 TABLET | Refills: 0 | Status: CANCELLED | OUTPATIENT
Start: 2025-01-27

## 2025-01-29 ENCOUNTER — OFFICE VISIT (OUTPATIENT)
Dept: FAMILY MEDICINE CLINIC | Facility: CLINIC | Age: 65
End: 2025-01-29
Payer: COMMERCIAL

## 2025-01-29 VITALS
OXYGEN SATURATION: 97 % | BODY MASS INDEX: 29.81 KG/M2 | HEIGHT: 64 IN | TEMPERATURE: 97.8 F | DIASTOLIC BLOOD PRESSURE: 78 MMHG | HEART RATE: 96 BPM | WEIGHT: 174.6 LBS | RESPIRATION RATE: 15 BRPM | SYSTOLIC BLOOD PRESSURE: 124 MMHG

## 2025-01-29 DIAGNOSIS — N39.46 MIXED STRESS AND URGE URINARY INCONTINENCE: ICD-10-CM

## 2025-01-29 DIAGNOSIS — Z13.29 SCREENING FOR THYROID DISORDER: ICD-10-CM

## 2025-01-29 DIAGNOSIS — Z13.1 SCREENING FOR DIABETES MELLITUS: ICD-10-CM

## 2025-01-29 DIAGNOSIS — F41.8 DEPRESSION WITH ANXIETY: ICD-10-CM

## 2025-01-29 DIAGNOSIS — E53.8 VITAMIN B12 DEFICIENCY: ICD-10-CM

## 2025-01-29 DIAGNOSIS — Z00.00 ANNUAL PHYSICAL EXAM: Primary | ICD-10-CM

## 2025-01-29 DIAGNOSIS — E78.5 HYPERLIPIDEMIA, UNSPECIFIED HYPERLIPIDEMIA TYPE: ICD-10-CM

## 2025-01-29 DIAGNOSIS — E55.9 VITAMIN D DEFICIENCY: ICD-10-CM

## 2025-01-29 DIAGNOSIS — Z13.220 SCREENING FOR CHOLESTEROL LEVEL: ICD-10-CM

## 2025-01-29 DIAGNOSIS — Z13.0 SCREENING FOR BLOOD DISEASE: ICD-10-CM

## 2025-01-29 DIAGNOSIS — Z13.228 SCREENING FOR METABOLIC DISORDER: ICD-10-CM

## 2025-01-29 DIAGNOSIS — F17.210 CIGARETTE NICOTINE DEPENDENCE WITHOUT COMPLICATION: ICD-10-CM

## 2025-01-29 DIAGNOSIS — J44.9 CHRONIC OBSTRUCTIVE PULMONARY DISEASE, UNSPECIFIED COPD TYPE (HCC): ICD-10-CM

## 2025-01-29 DIAGNOSIS — Z13.89 SCREENING FOR BLOOD OR PROTEIN IN URINE: ICD-10-CM

## 2025-01-29 PROCEDURE — 99396 PREV VISIT EST AGE 40-64: CPT | Performed by: FAMILY MEDICINE

## 2025-01-29 RX ORDER — OXYBUTYNIN CHLORIDE 10 MG/1
10 TABLET, EXTENDED RELEASE ORAL
Qty: 30 TABLET | Refills: 3 | Status: SHIPPED | OUTPATIENT
Start: 2025-01-29

## 2025-01-29 RX ORDER — IBUPROFEN 600 MG/1
TABLET, FILM COATED ORAL
COMMUNITY
Start: 2025-01-28

## 2025-01-29 NOTE — PROGRESS NOTES
Adult Annual Physical  Name: Belinda Valencia      : 1960      MRN: [id number]  Encounter Provider: Sherie Isaac MD  Encounter Date: 2025   Encounter department: [facility]    Assessment & Plan    Annual physical exam   - patient declines Covid and flu vaccines  - screening labs ordered       - Comprehensive metabolic panel       - CBC and differential       - TSH, 3rd generation with Free T4 reflex    Depression with anxiety   - advised to restart sertraline 50 mg 0.5 tablet daily for 1 week, then increase to 1 tablet daily and continue trazodone at bedtime, also discussed counseling    Chronic obstructive pulmonary disease  - following with Pulmonary, on Trelegy and Albuterol as needed    Hyperlipidemia  - discussed life style changes, will recheck lipid panel       - Lipid Panel with Direct LDL reflex    Vitamin D deficiency  - continue vitamin D 2000 IU daily, will recheck vit D level       - Vitamin D 25 hydroxy    Vitamin B12 deficiency  - continue vitamin B12 1000 mcg daily, will recheck labs       - Vitamin B12    Mixed stress and urge urinary incontinence  - will check ua, oxybutynin 10 mg daily at bedtime prescribed, side effects reviewed, discussed Uro-gyn referral, however patient declined at this time       - UA (URINE) with reflex to Scope    Cigarette nicotine dependence   - patient continues to smoke 1/2 - 3/4 PPD and not ready to quit, discussed smoking cessation, LDCT ordered       - CT lung screening program      Return in 6 months or sooner as needed.   Immunizations and preventive care screenings were discussed with patient today. Appropriate education was printed on patient's after visit summary.    Counseling:  Alcohol/drug use: discussed moderation in alcohol intake, the recommendations for healthy alcohol use, and avoidance of illicit drug use.  Dental Health: discussed importance of regular tooth brushing, flossing, and dental  visits.  Injury prevention: discussed safety/seat belts, safety helmets, smoke detectors, carbon monoxide detectors, and smoking near bedding or upholstery.  Exercise: the importance of regular exercise/physical activity was discussed. Recommend exercise 3-5 times per week for at least 30 minutes.          History of Present Illness     Patient presents today for annual physical. Patient reports increased anxiety and feeling stressed out for the several weeks and would like to discuss going back on sertraline which she took in the past with good results.   Patient also reports urinary urgency and incontinence with coughing or sneezing which getting progressively worse over the past few months, no pain or burning with urination.     Adult Annual Physical:  Patient presents for annual physical.     Diet and Physical Activity:  - Diet/Nutrition: well balanced diet.  - Exercise: no formal exercise.    Depression Screening:  - PHQ-2 Score: 2    General Health:  - Sleep: 7-8 hours of sleep on average.  - Hearing:. no issues  - Vision: goes for regular eye exams.  - Dental: regular dental visits.    /GYN Health:    - Menopause: postmenopausal.     Review of Systems   Constitutional:  Negative for appetite change, chills, fatigue, fever and unexpected weight change.   HENT:  Negative for congestion, ear pain, rhinorrhea and sore throat.    Eyes:  Negative for pain, discharge, redness, itching and visual disturbance.   Respiratory:  Negative for wheezing.         Chronic cough and dyspnea   Cardiovascular:  Negative for chest pain, palpitations and leg swelling.   Gastrointestinal:  Negative for abdominal pain, blood in stool, constipation, diarrhea, nausea and vomiting.   Endocrine: Negative for cold intolerance, heat intolerance, polydipsia and polyuria.   Genitourinary:  Negative for difficulty urinating, dysuria, frequency, hematuria, pelvic pain, vaginal bleeding and vaginal discharge.        As per HPI  "  Neurological:  Negative for dizziness, syncope, weakness, numbness and headaches.   Hematological:  Negative for adenopathy.   Psychiatric/Behavioral:  Negative for dysphoric mood and sleep disturbance. The patient is nervous/anxious.          Objective   /78   Pulse 96   Temp 97.8 °F (36.6 °C)   Resp 15   Ht 5' 4\" (1.626 m)   Wt 79.2 kg (174 lb 9.6 oz)   SpO2 97%   BMI 29.97 kg/m²     Physical Exam  Constitutional:       General: She is not in acute distress.     Appearance: She is well-developed.   HENT:      Right Ear: Tympanic membrane, ear canal and external ear normal.      Left Ear: Tympanic membrane, ear canal and external ear normal.      Mouth/Throat:      Mouth: Mucous membranes are moist.      Pharynx: Oropharynx is clear.   Eyes:      General: No scleral icterus.     Extraocular Movements: Extraocular movements intact.      Conjunctiva/sclera: Conjunctivae normal.      Pupils: Pupils are equal, round, and reactive to light.   Neck:      Thyroid: No thyromegaly.      Vascular: No JVD.   Cardiovascular:      Rate and Rhythm: Normal rate and regular rhythm.      Heart sounds: Normal heart sounds. No murmur heard.  Pulmonary:      Effort: Pulmonary effort is normal. No respiratory distress.      Breath sounds: Normal breath sounds. No wheezing, rhonchi or rales.   Abdominal:      General: There is no distension.      Palpations: Abdomen is soft. There is no mass.      Tenderness: There is no abdominal tenderness.   Musculoskeletal:      Cervical back: Neck supple.      Right lower leg: No edema.      Left lower leg: No edema.   Lymphadenopathy:      Cervical: No cervical adenopathy.   Skin:     Findings: No rash.   Neurological:      General: No focal deficit present.      Mental Status: She is alert and oriented to person, place, and time.      Cranial Nerves: No cranial nerve deficit.   Psychiatric:         Mood and Affect: Mood normal.         Behavior: Behavior normal.         Thought " Content: Thought content normal.         Judgment: Judgment normal.

## 2025-01-29 NOTE — PATIENT INSTRUCTIONS
"Patient Education     Routine physical for adults   The Basics   Written by the doctors and editors at [facility]   What is a physical? -- A physical is a routine visit, or \"check-up,\" with your doctor. You might also hear it called a \"wellness visit\" or \"preventive visit.\"  During each visit, the doctor will:   Ask about your physical and mental health   Ask about your habits, behaviors, and lifestyle   Do an exam   Give you vaccines if needed   Talk to you about any medicines you take   Give advice about your health   Answer your questions  Getting regular check-ups is an important part of taking care of your health. It can help your doctor find and treat any problems you have. But it's also important for preventing health problems.  A routine physical is different from a \"sick visit.\" A sick visit is when you see a doctor because of a health concern or problem. Since physicals are scheduled ahead of time, you can think about what you want to ask the doctor.  How often should I get a physical? -- It depends on your age and health. In general, for people age 21 years and older:   If you are younger than 50 years, you might be able to get a physical every 3 years.   If you are 50 years or older, your doctor might recommend a physical every year.  If you have an ongoing health condition, like diabetes or high blood pressure, your doctor will probably want to see you more often.  What happens during a physical? -- In general, each visit will include:   Physical exam - The doctor or nurse will check your height, weight, heart rate, and blood pressure. They will also look at your eyes and ears. They will ask about how you are feeling and whether you have any symptoms that bother you.   Medicines - It's a good idea to bring a list of all the medicines you take to each doctor visit. Your doctor will talk to you about your medicines and answer any questions. Tell them if you are having any side effects that bother you. You " "should also tell them if you are having trouble paying for any of your medicines.   Habits and behaviors - This includes:   Your diet   Your exercise habits   Whether you smoke, drink alcohol, or use drugs   Whether you are sexually active   Whether you feel safe at home  Your doctor will talk to you about things you can do to improve your health and lower your risk of health problems. They will also offer help and support. For example, if you want to quit smoking, they can give you advice and might prescribe medicines. If you want to improve your diet or get more physical activity, they can help you with this, too.   Lab tests, if needed - The tests you get will depend on your age and situation. For example, your doctor might want to check your:   Cholesterol   Blood sugar   Iron level   Vaccines - The recommended vaccines will depend on your age, health, and what vaccines you already had. Vaccines are very important because they can prevent certain serious or deadly infections.   Discussion of screening - \"Screening\" means checking for diseases or other health problems before they cause symptoms. Your doctor can recommend screening based on your age, risk, and preferences. This might include tests to check for:   Cancer, such as breast, prostate, cervical, ovarian, colorectal, prostate, lung, or skin cancer   Sexually transmitted infections, such as chlamydia and gonorrhea   Mental health conditions like depression and anxiety  Your doctor will talk to you about the different types of screening tests. They can help you decide which screenings to have. They can also explain what the results might mean.   Answering questions - The physical is a good time to ask the doctor or nurse questions about your health. If needed, they can refer you to other doctors or specialists, too.  Adults older than 65 years often need other care, too. As you get older, your doctor will talk to you about:   How to prevent falling at " home   Hearing or vision tests   Memory testing   How to take your medicines safely   Making sure that you have the help and support you need at home  All topics are updated as new evidence becomes available and our peer review process is complete.  This topic retrieved from [employer] on: May 02, 2024.  Topic 456002 Version 1.0  Release: 32.4.3 - C32.122  © 2024 UpToDate, Inc. and/or its affiliates. All rights reserved.  Consumer Information Use and Disclaimer   Disclaimer: This generalized information is a limited summary of diagnosis, treatment, and/or medication information. It is not meant to be comprehensive and should be used as a tool to help the user understand and/or assess potential diagnostic and treatment options. It does NOT include all information about conditions, treatments, medications, side effects, or risks that may apply to a specific patient. It is not intended to be medical advice or a substitute for the medical advice, diagnosis, or treatment of a health care provider based on the health care provider's examination and assessment of a patient's specific and unique circumstances. Patients must speak with a health care provider for complete information about their health, medical questions, and treatment options, including any risks or benefits regarding use of medications. This information does not endorse any treatments or medications as safe, effective, or approved for treating a specific patient. UpToDate, Inc. and its affiliates disclaim any warranty or liability relating to this information or the use thereof.The use of this information is governed by the Terms of Use, available at https://www.[employer].com/en/know/clinical-effectiveness-terms. 2024© UpToDate, Inc. and its affiliates and/or licensors. All rights reserved.  Copyright   © 2024 UpToDate, Inc. and/or its affiliates. All rights reserved.

## 2025-02-04 DIAGNOSIS — G47.00 INSOMNIA, UNSPECIFIED TYPE: ICD-10-CM

## 2025-02-04 RX ORDER — TRAZODONE HYDROCHLORIDE 50 MG/1
100 TABLET, FILM COATED ORAL
Qty: 60 TABLET | Refills: 5 | Status: SHIPPED | OUTPATIENT
Start: 2025-02-04

## 2025-02-06 ENCOUNTER — APPOINTMENT (EMERGENCY)
Dept: RADIOLOGY | Facility: HOSPITAL | Age: 65
End: 2025-02-06
Payer: COMMERCIAL

## 2025-02-06 ENCOUNTER — HOSPITAL ENCOUNTER (EMERGENCY)
Facility: HOSPITAL | Age: 65
Discharge: HOME | End: 2025-02-06
Attending: EMERGENCY MEDICINE
Payer: COMMERCIAL

## 2025-02-06 VITALS
RESPIRATION RATE: 16 BRPM | TEMPERATURE: 98.1 F | OXYGEN SATURATION: 96 % | SYSTOLIC BLOOD PRESSURE: 130 MMHG | DIASTOLIC BLOOD PRESSURE: 62 MMHG | HEART RATE: 71 BPM

## 2025-02-06 DIAGNOSIS — R55 NEAR SYNCOPE: Primary | ICD-10-CM

## 2025-02-06 LAB
2HR DELTA HS TROPONIN: 0 NG/L
ALBUMIN SERPL BCG-MCNC: 3.8 G/DL (ref 3.5–5)
ALP SERPL-CCNC: 65 U/L (ref 34–104)
ALT SERPL W P-5'-P-CCNC: 9 U/L (ref 7–52)
ANION GAP SERPL CALCULATED.3IONS-SCNC: 8 MMOL/L (ref 4–13)
AST SERPL W P-5'-P-CCNC: 11 U/L (ref 13–39)
BASOPHILS # BLD AUTO: 0.06 THOUSANDS/ΜL (ref 0–0.1)
BASOPHILS NFR BLD AUTO: 1 % (ref 0–1)
BILIRUB SERPL-MCNC: 0.36 MG/DL (ref 0.2–1)
BUN SERPL-MCNC: 11 MG/DL (ref 5–25)
CALCIUM SERPL-MCNC: 9.8 MG/DL (ref 8.4–10.2)
CARDIAC TROPONIN I PNL SERPL HS: 3 NG/L (ref ?–50)
CARDIAC TROPONIN I PNL SERPL HS: 3 NG/L (ref ?–50)
CHLORIDE SERPL-SCNC: 101 MMOL/L (ref 96–108)
CO2 SERPL-SCNC: 28 MMOL/L (ref 21–32)
CREAT SERPL-MCNC: 1.06 MG/DL (ref 0.6–1.3)
EOSINOPHIL # BLD AUTO: 0.21 THOUSAND/ΜL (ref 0–0.61)
EOSINOPHIL NFR BLD AUTO: 2 % (ref 0–6)
ERYTHROCYTE [DISTWIDTH] IN BLOOD BY AUTOMATED COUNT: 13 % (ref 11.6–15.1)
GFR SERPL CREATININE-BSD FRML MDRD: 56 ML/MIN/1.73SQ M
GLUCOSE SERPL-MCNC: 110 MG/DL (ref 65–140)
HCT VFR BLD AUTO: 43.6 % (ref 34.8–46.1)
HGB BLD-MCNC: 14.2 G/DL (ref 11.5–15.4)
IMM GRANULOCYTES # BLD AUTO: 0.03 THOUSAND/UL (ref 0–0.2)
IMM GRANULOCYTES NFR BLD AUTO: 0 % (ref 0–2)
LYMPHOCYTES # BLD AUTO: 1.8 THOUSANDS/ΜL (ref 0.6–4.47)
LYMPHOCYTES NFR BLD AUTO: 20 % (ref 14–44)
MCH RBC QN AUTO: 30.4 PG (ref 26.8–34.3)
MCHC RBC AUTO-ENTMCNC: 32.6 G/DL (ref 31.4–37.4)
MCV RBC AUTO: 93 FL (ref 82–98)
MONOCYTES # BLD AUTO: 0.6 THOUSAND/ΜL (ref 0.17–1.22)
MONOCYTES NFR BLD AUTO: 7 % (ref 4–12)
NEUTROPHILS # BLD AUTO: 6.24 THOUSANDS/ΜL (ref 1.85–7.62)
NEUTS SEG NFR BLD AUTO: 70 % (ref 43–75)
NRBC BLD AUTO-RTO: 0 /100 WBCS
PLATELET # BLD AUTO: 325 THOUSANDS/UL (ref 149–390)
PMV BLD AUTO: 8.9 FL (ref 8.9–12.7)
POTASSIUM SERPL-SCNC: 3.9 MMOL/L (ref 3.5–5.3)
PROT SERPL-MCNC: 6.8 G/DL (ref 6.4–8.4)
RBC # BLD AUTO: 4.67 MILLION/UL (ref 3.81–5.12)
SODIUM SERPL-SCNC: 137 MMOL/L (ref 135–147)
WBC # BLD AUTO: 8.94 THOUSAND/UL (ref 4.31–10.16)

## 2025-02-06 PROCEDURE — 80053 COMPREHEN METABOLIC PANEL: CPT

## 2025-02-06 PROCEDURE — 84484 ASSAY OF TROPONIN QUANT: CPT

## 2025-02-06 PROCEDURE — 36415 COLL VENOUS BLD VENIPUNCTURE: CPT

## 2025-02-06 PROCEDURE — 71045 X-RAY EXAM CHEST 1 VIEW: CPT

## 2025-02-06 PROCEDURE — 85025 COMPLETE CBC W/AUTO DIFF WBC: CPT

## 2025-02-06 PROCEDURE — 93005 ELECTROCARDIOGRAM TRACING: CPT

## 2025-02-06 PROCEDURE — 99285 EMERGENCY DEPT VISIT HI MDM: CPT

## 2025-02-06 PROCEDURE — 99285 EMERGENCY DEPT VISIT HI MDM: CPT | Performed by: PHYSICIAN ASSISTANT
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
## 2025-02-07 LAB
ATRIAL RATE: 64 BPM
P AXIS: 78 DEGREES
PR INTERVAL: 168 MS
QRS AXIS: 63 DEGREES
QRSD INTERVAL: 78 MS
QT INTERVAL: 376 MS
QTC INTERVAL: 387 MS
T WAVE AXIS: 71 DEGREES
VENTRICULAR RATE: 64 BPM

## 2025-02-07 PROCEDURE — 93010 ELECTROCARDIOGRAM REPORT: CPT | Performed by: INTERNAL MEDICINE

## 2025-03-05 DIAGNOSIS — N39.46 MIXED STRESS AND URGE URINARY INCONTINENCE: ICD-10-CM

## 2025-03-05 DIAGNOSIS — G47.00 INSOMNIA, UNSPECIFIED TYPE: ICD-10-CM

## 2025-03-05 DIAGNOSIS — F41.8 DEPRESSION WITH ANXIETY: ICD-10-CM

## 2025-03-06 RX ORDER — OXYBUTYNIN CHLORIDE 10 MG/1
10 TABLET, EXTENDED RELEASE ORAL
Qty: 30 TABLET | Refills: 0 | OUTPATIENT
Start: 2025-03-06

## 2025-03-06 RX ORDER — IBUPROFEN 600 MG/1
TABLET, FILM COATED ORAL
Qty: 30 TABLET | Refills: 0 | OUTPATIENT
Start: 2025-03-06

## 2025-03-06 RX ORDER — TRAZODONE HYDROCHLORIDE 50 MG/1
100 TABLET ORAL
Qty: 60 TABLET | Refills: 0 | OUTPATIENT
Start: 2025-03-06

## 2025-03-31 ENCOUNTER — OFFICE VISIT (OUTPATIENT)
Dept: FAMILY MEDICINE CLINIC | Facility: CLINIC | Age: 65
End: 2025-03-31
Payer: COMMERCIAL

## 2025-03-31 VITALS
HEART RATE: 91 BPM | DIASTOLIC BLOOD PRESSURE: 84 MMHG | SYSTOLIC BLOOD PRESSURE: 128 MMHG | HEIGHT: 64 IN | WEIGHT: 170 LBS | RESPIRATION RATE: 16 BRPM | OXYGEN SATURATION: 96 % | TEMPERATURE: 97.5 F | BODY MASS INDEX: 29.02 KG/M2

## 2025-03-31 DIAGNOSIS — R19.7 DIARRHEA, UNSPECIFIED TYPE: Primary | ICD-10-CM

## 2025-03-31 PROCEDURE — 99213 OFFICE O/P EST LOW 20 MIN: CPT | Performed by: FAMILY MEDICINE

## 2025-03-31 RX ORDER — FLUTICASONE PROPIONATE AND SALMETEROL 500; 50 UG/1; UG/1
POWDER RESPIRATORY (INHALATION)
COMMUNITY
Start: 2025-03-12

## 2025-03-31 NOTE — PROGRESS NOTES
"Name: Belinda Valencia      : 1960      MRN: [id number]  Encounter Provider: Sherie Isaac MD  Encounter Date: 3/31/2025   Encounter department: [facility]  :  Assessment & Plan  Diarrhea, unspecified type  - stool cultures ordered, advised to see GI for further evaluation, advised to avoid caffeine and dairy and follow BRAT diet, encouraged to contact the office for persistent or worsening symptoms    Orders:    Stool Enteric Bacterial Panel by PCR; Future    Clostridioides difficile toxin by PCR with EIA; Future    Ova and parasite examination; Future    Ambulatory Referral to Gastroenterology; Future         History of Present Illness   Patient presents today with c/o recurrent episodes of diarrhea since her cholecystectomy in 2023. Her symptoms now getting progressively worse in the past month, she reports having 3-6 episodes of watery stools a day, no blood or mucus in stool, no abdominal pain, no fever or chills, no recent travel or ill contacts.       Review of Systems   Constitutional:  Negative for fatigue, fever and unexpected weight change.   Respiratory:  Negative for shortness of breath and wheezing.         Chronic cough    Cardiovascular:  Negative for chest pain, palpitations and leg swelling.   Gastrointestinal:  Positive for diarrhea. Negative for abdominal pain, blood in stool, constipation, nausea and vomiting.   Genitourinary:  Negative for difficulty urinating, dysuria, frequency and hematuria.   Neurological:  Negative for dizziness, syncope and headaches.       Objective   /84   Pulse 91   Temp 97.5 °F (36.4 °C)   Resp 16   Ht 5' 4\" (1.626 m)   Wt 77.1 kg (170 lb)   SpO2 96%   BMI 29.18 kg/m²      Wt Readings from Last 3 Encounters:   25 77.1 kg (170 lb)   25 79.2 kg (174 lb 9.6 oz)   24 75.3 kg (166 lb)     Physical Exam  Constitutional:       General: She is not in acute distress.     Appearance: " She is well-developed.   Neck:      Thyroid: No thyromegaly.   Cardiovascular:      Rate and Rhythm: Normal rate and regular rhythm.      Heart sounds: Normal heart sounds.   Pulmonary:      Effort: Pulmonary effort is normal. No respiratory distress.      Breath sounds: Normal breath sounds.   Abdominal:      Palpations: Abdomen is soft. There is no mass.      Tenderness: There is no abdominal tenderness. There is no guarding or rebound.   Musculoskeletal:      Cervical back: Neck supple.      Right lower leg: No edema.      Left lower leg: No edema.   Lymphadenopathy:      Cervical: No cervical adenopathy.   Neurological:      Mental Status: She is alert and oriented to person, place, and time.   Psychiatric:         Mood and Affect: Mood normal.         Behavior: Behavior normal.       Lab Results   Component Value Date    HBJ7DWROVESP 1.908 05/17/2024     Lab Results   Component Value Date    WBC 8.94 02/06/2025    HGB 14.2 02/06/2025    HCT 43.6 02/06/2025    MCV 93 02/06/2025     02/06/2025     Lab Results   Component Value Date    SODIUM 137 02/06/2025    K 3.9 02/06/2025     02/06/2025    CO2 28 02/06/2025    AGAP 8 02/06/2025    BUN 11 02/06/2025    CREATININE 1.06 02/06/2025    GLUC 110 02/06/2025    CALCIUM 9.8 02/06/2025    AST 11 (L) 02/06/2025    ALT 9 02/06/2025    ALKPHOS 65 02/06/2025    TP 6.8 02/06/2025    TBILI 0.36 02/06/2025    EGFR 56 02/06/2025

## 2025-06-21 DIAGNOSIS — N39.46 MIXED STRESS AND URGE URINARY INCONTINENCE: ICD-10-CM

## 2025-06-22 RX ORDER — OXYBUTYNIN CHLORIDE 10 MG/1
10 TABLET, EXTENDED RELEASE ORAL
Qty: 30 TABLET | Refills: 5 | Status: SHIPPED | OUTPATIENT
Start: 2025-06-22

## 2025-07-23 DIAGNOSIS — J44.9 CHRONIC OBSTRUCTIVE PULMONARY DISEASE, UNSPECIFIED COPD TYPE (HCC): ICD-10-CM

## 2025-07-23 RX ORDER — ALBUTEROL SULFATE 0.83 MG/ML
2.5 SOLUTION RESPIRATORY (INHALATION) EVERY 4 HOURS PRN
Qty: 180 ML | Refills: 0 | Status: SHIPPED | OUTPATIENT
Start: 2025-07-23

## 2025-07-23 RX ORDER — ALBUTEROL SULFATE 0.83 MG/ML
2.5 SOLUTION RESPIRATORY (INHALATION) EVERY 4 HOURS PRN
Qty: 180 ML | Refills: 0 | OUTPATIENT
Start: 2025-07-23

## 2025-07-23 NOTE — TELEPHONE ENCOUNTER
Requested medication(s) are due for refill today: No  Patient has already received a courtesy refill: No  Other reason request has been forwarded to provider: please assist

## 2025-07-23 NOTE — TELEPHONE ENCOUNTER
Patient has not been seen in 2 years.  She needs to be scheduled an appointment before further refills.

## 2025-08-02 DIAGNOSIS — G47.00 INSOMNIA, UNSPECIFIED TYPE: ICD-10-CM

## 2025-08-04 RX ORDER — TRAZODONE HYDROCHLORIDE 50 MG/1
TABLET ORAL
Qty: 60 TABLET | Refills: 5 | Status: SHIPPED | OUTPATIENT
Start: 2025-08-04

## (undated) DEVICE — PERMANENT CAUTERY HOOK: Brand: ENDOWRIST

## (undated) DEVICE — SUT VICRYL PLUS 0 UR-6 27IN VCP603H

## (undated) DEVICE — INTENDED FOR TISSUE SEPARATION, AND OTHER PROCEDURES THAT REQUIRE A SHARP SURGICAL BLADE TO PUNCTURE OR CUT.: Brand: BARD-PARKER SAFETY BLADES SIZE 15, STERILE

## (undated) DEVICE — DRAPE SHEET THREE QUARTER

## (undated) DEVICE — SUT MONOCRYL PLUS 4-0 PS-2 18 IN MCP496G

## (undated) DEVICE — CANNULA SEAL

## (undated) DEVICE — COLUMN DRAPE

## (undated) DEVICE — MEDIUM-LARGE CLIP APPLIER: Brand: ENDOWRIST

## (undated) DEVICE — INSUFFLATION NEEDLE TO ESTABLISH PNEUMOPERITONEUM.: Brand: INSUFFLATION NEEDLE

## (undated) DEVICE — HEM-O-LOK CLIP CARTRIDGE MED/LARGE DA VINCI SI/XI

## (undated) DEVICE — INTENDED FOR TISSUE SEPARATION, AND OTHER PROCEDURES THAT REQUIRE A SHARP SURGICAL BLADE TO PUNCTURE OR CUT.: Brand: BARD-PARKER SAFETY BLADES SIZE 11, STERILE

## (undated) DEVICE — TUBING SMOKE EVAC W/FILTRATION DEVICE PLUMEPORT ACTIV

## (undated) DEVICE — LAPAROSCOPIC TROCAR SLEEVE/SINGLE USE: Brand: KII® OPTICAL ACCESS SYSTEM

## (undated) DEVICE — GLOVE SRG BIOGEL ORTHOPEDIC 7.5

## (undated) DEVICE — 3M™ IOBAN™ 2 ANTIMICROBIAL INCISE DRAPE 6650EZ: Brand: IOBAN™ 2

## (undated) DEVICE — INSUFLATION TUBING INSUFLOW (LEXION)

## (undated) DEVICE — SEAL

## (undated) DEVICE — FORCE BIPOLAR: Brand: ENDOWRIST

## (undated) DEVICE — PACK PBDS LAP CHOLE RF

## (undated) DEVICE — SUT SILK 2-0 18 IN A185H

## (undated) DEVICE — HEAVY DUTY TABLE COVER: Brand: CONVERTORS

## (undated) DEVICE — REDUCER: Brand: ENDOWRIST

## (undated) DEVICE — ADHESIVE SKIN HIGH VISCOSITY EXOFIN 1ML

## (undated) DEVICE — VISUALIZATION SYSTEM: Brand: CLEARIFY

## (undated) DEVICE — BLADELESS OBTURATOR: Brand: WECK VISTA

## (undated) DEVICE — ARM DRAPE

## (undated) DEVICE — DRAPE LAPAROTOMY W/POUCHES

## (undated) DEVICE — CLAMP TOWEL TUBING DISPOSABLE

## (undated) DEVICE — TISSUE RETRIEVAL SYSTEM: Brand: INZII RETRIEVAL SYSTEM

## (undated) DEVICE — GLOVE INDICATOR PI UNDERGLOVE SZ 8 BLUE